# Patient Record
Sex: FEMALE | Race: BLACK OR AFRICAN AMERICAN | NOT HISPANIC OR LATINO | URBAN - METROPOLITAN AREA
[De-identification: names, ages, dates, MRNs, and addresses within clinical notes are randomized per-mention and may not be internally consistent; named-entity substitution may affect disease eponyms.]

---

## 2017-02-01 ENCOUNTER — INPATIENT (INPATIENT)
Facility: HOSPITAL | Age: 77
LOS: 1 days | Discharge: HOME | End: 2017-02-03
Attending: INTERNAL MEDICINE | Admitting: INTERNAL MEDICINE
Payer: MEDICARE

## 2017-02-01 VITALS
DIASTOLIC BLOOD PRESSURE: 65 MMHG | OXYGEN SATURATION: 100 % | SYSTOLIC BLOOD PRESSURE: 134 MMHG | HEART RATE: 85 BPM | HEIGHT: 60 IN | RESPIRATION RATE: 16 BRPM | TEMPERATURE: 98 F | WEIGHT: 164.91 LBS

## 2017-02-01 DIAGNOSIS — E11.9 TYPE 2 DIABETES MELLITUS WITHOUT COMPLICATIONS: ICD-10-CM

## 2017-02-01 LAB
ALBUMIN SERPL ELPH-MCNC: 3.5 G/DL — SIGNIFICANT CHANGE UP (ref 3.3–5)
ALP SERPL-CCNC: 80 U/L — SIGNIFICANT CHANGE UP (ref 40–120)
ALT FLD-CCNC: 19 U/L — SIGNIFICANT CHANGE UP (ref 12–78)
ANION GAP SERPL CALC-SCNC: 12 MMOL/L — SIGNIFICANT CHANGE UP (ref 5–17)
APTT BLD: 31.3 SEC — SIGNIFICANT CHANGE UP (ref 27.5–37.4)
AST SERPL-CCNC: 21 U/L — SIGNIFICANT CHANGE UP (ref 15–37)
BASOPHILS # BLD AUTO: 0.1 K/UL — SIGNIFICANT CHANGE UP (ref 0–0.2)
BASOPHILS NFR BLD AUTO: 0.8 % — SIGNIFICANT CHANGE UP (ref 0–2)
BILIRUB SERPL-MCNC: 0.4 MG/DL — SIGNIFICANT CHANGE UP (ref 0.2–1.2)
BLD GP AB SCN SERPL QL: SIGNIFICANT CHANGE UP
BUN SERPL-MCNC: 41 MG/DL — HIGH (ref 7–23)
CALCIUM SERPL-MCNC: 8.1 MG/DL — LOW (ref 8.5–10.1)
CHLORIDE SERPL-SCNC: 103 MMOL/L — SIGNIFICANT CHANGE UP (ref 96–108)
CK MB BLD-MCNC: <0.8 % — SIGNIFICANT CHANGE UP (ref 0–3.5)
CK MB CFR SERPL CALC: <0.5 NG/ML — SIGNIFICANT CHANGE UP (ref 0.5–3.6)
CK SERPL-CCNC: 66 U/L — SIGNIFICANT CHANGE UP (ref 26–192)
CO2 SERPL-SCNC: 28 MMOL/L — SIGNIFICANT CHANGE UP (ref 22–31)
CREAT SERPL-MCNC: 8.68 MG/DL — HIGH (ref 0.5–1.3)
EOSINOPHIL # BLD AUTO: 0.1 K/UL — SIGNIFICANT CHANGE UP (ref 0–0.5)
EOSINOPHIL NFR BLD AUTO: 1.3 % — SIGNIFICANT CHANGE UP (ref 0–6)
GLUCOSE SERPL-MCNC: 149 MG/DL — HIGH (ref 70–99)
HCT VFR BLD CALC: 35 % — SIGNIFICANT CHANGE UP (ref 34.5–45)
HGB BLD-MCNC: 11.2 G/DL — LOW (ref 11.5–15.5)
INR BLD: 1.09 RATIO — SIGNIFICANT CHANGE UP (ref 0.88–1.16)
LACTATE SERPL-SCNC: 1.3 MMOL/L — SIGNIFICANT CHANGE UP (ref 0.7–2)
LYMPHOCYTES # BLD AUTO: 0.9 K/UL — LOW (ref 1–3.3)
LYMPHOCYTES # BLD AUTO: 14.6 % — SIGNIFICANT CHANGE UP (ref 13–44)
MCHC RBC-ENTMCNC: 32 GM/DL — SIGNIFICANT CHANGE UP (ref 32–36)
MCHC RBC-ENTMCNC: 32 PG — SIGNIFICANT CHANGE UP (ref 27–34)
MCV RBC AUTO: 100.1 FL — HIGH (ref 80–100)
MONOCYTES # BLD AUTO: 0.4 K/UL — SIGNIFICANT CHANGE UP (ref 0–0.9)
MONOCYTES NFR BLD AUTO: 6.9 % — SIGNIFICANT CHANGE UP (ref 2–14)
NEUTROPHILS # BLD AUTO: 4.8 K/UL — SIGNIFICANT CHANGE UP (ref 1.8–7.4)
NEUTROPHILS NFR BLD AUTO: 76.4 % — SIGNIFICANT CHANGE UP (ref 43–77)
PLATELET # BLD AUTO: 156 K/UL — SIGNIFICANT CHANGE UP (ref 150–400)
POTASSIUM SERPL-MCNC: 3.9 MMOL/L — SIGNIFICANT CHANGE UP (ref 3.5–5.3)
POTASSIUM SERPL-SCNC: 3.9 MMOL/L — SIGNIFICANT CHANGE UP (ref 3.5–5.3)
PROT SERPL-MCNC: 7.6 GM/DL — SIGNIFICANT CHANGE UP (ref 6–8.3)
PROTHROM AB SERPL-ACNC: 12.2 SEC — SIGNIFICANT CHANGE UP (ref 10–13.1)
RBC # BLD: 3.49 M/UL — LOW (ref 3.8–5.2)
RBC # FLD: 17.9 % — HIGH (ref 11–15)
SODIUM SERPL-SCNC: 143 MMOL/L — SIGNIFICANT CHANGE UP (ref 135–145)
TROPONIN I SERPL-MCNC: <.015 NG/ML — SIGNIFICANT CHANGE UP (ref 0.01–0.04)
WBC # BLD: 6.3 K/UL — SIGNIFICANT CHANGE UP (ref 3.8–10.5)
WBC # FLD AUTO: 6.3 K/UL — SIGNIFICANT CHANGE UP (ref 3.8–10.5)

## 2017-02-01 PROCEDURE — 70551 MRI BRAIN STEM W/O DYE: CPT | Mod: 26

## 2017-02-01 PROCEDURE — 70450 CT HEAD/BRAIN W/O DYE: CPT | Mod: 26

## 2017-02-01 PROCEDURE — 99285 EMERGENCY DEPT VISIT HI MDM: CPT

## 2017-02-01 PROCEDURE — 71010: CPT | Mod: 26

## 2017-02-01 RX ORDER — LOSARTAN POTASSIUM 100 MG/1
100 TABLET, FILM COATED ORAL DAILY
Qty: 0 | Refills: 0 | Status: DISCONTINUED | OUTPATIENT
Start: 2017-02-01 | End: 2017-02-03

## 2017-02-01 RX ORDER — BRIMONIDINE TARTRATE 2 MG/MG
1 SOLUTION/ DROPS OPHTHALMIC
Qty: 0 | Refills: 0 | Status: DISCONTINUED | OUTPATIENT
Start: 2017-02-01 | End: 2017-02-03

## 2017-02-01 RX ORDER — CALCIUM ACETATE 667 MG
1334 TABLET ORAL
Qty: 0 | Refills: 0 | Status: DISCONTINUED | OUTPATIENT
Start: 2017-02-01 | End: 2017-02-03

## 2017-02-01 RX ORDER — INSULIN LISPRO 100/ML
VIAL (ML) SUBCUTANEOUS
Qty: 0 | Refills: 0 | Status: DISCONTINUED | OUTPATIENT
Start: 2017-02-01 | End: 2017-02-02

## 2017-02-01 RX ORDER — DEXTROSE 50 % IN WATER 50 %
12.5 SYRINGE (ML) INTRAVENOUS ONCE
Qty: 0 | Refills: 0 | Status: DISCONTINUED | OUTPATIENT
Start: 2017-02-01 | End: 2017-02-02

## 2017-02-01 RX ORDER — GLUCAGON INJECTION, SOLUTION 0.5 MG/.1ML
1 INJECTION, SOLUTION SUBCUTANEOUS ONCE
Qty: 0 | Refills: 0 | Status: DISCONTINUED | OUTPATIENT
Start: 2017-02-01 | End: 2017-02-02

## 2017-02-01 RX ORDER — AMLODIPINE BESYLATE 2.5 MG/1
10 TABLET ORAL DAILY
Qty: 0 | Refills: 0 | Status: DISCONTINUED | OUTPATIENT
Start: 2017-02-01 | End: 2017-02-03

## 2017-02-01 RX ORDER — SODIUM CHLORIDE 9 MG/ML
1000 INJECTION, SOLUTION INTRAVENOUS
Qty: 0 | Refills: 0 | Status: DISCONTINUED | OUTPATIENT
Start: 2017-02-01 | End: 2017-02-02

## 2017-02-01 RX ORDER — CALCIUM CARBONATE 500(1250)
4 TABLET ORAL DAILY
Qty: 0 | Refills: 0 | Status: DISCONTINUED | OUTPATIENT
Start: 2017-02-01 | End: 2017-02-03

## 2017-02-01 RX ORDER — DEXTROSE 50 % IN WATER 50 %
25 SYRINGE (ML) INTRAVENOUS ONCE
Qty: 0 | Refills: 0 | Status: DISCONTINUED | OUTPATIENT
Start: 2017-02-01 | End: 2017-02-02

## 2017-02-01 RX ORDER — DEXTROSE 50 % IN WATER 50 %
1 SYRINGE (ML) INTRAVENOUS ONCE
Qty: 0 | Refills: 0 | Status: DISCONTINUED | OUTPATIENT
Start: 2017-02-01 | End: 2017-02-02

## 2017-02-01 RX ORDER — FERROUS SULFATE 325(65) MG
325 TABLET ORAL DAILY
Qty: 0 | Refills: 0 | Status: DISCONTINUED | OUTPATIENT
Start: 2017-02-01 | End: 2017-02-03

## 2017-02-01 RX ORDER — ASPIRIN/CALCIUM CARB/MAGNESIUM 324 MG
81 TABLET ORAL DAILY
Qty: 0 | Refills: 0 | Status: DISCONTINUED | OUTPATIENT
Start: 2017-02-01 | End: 2017-02-03

## 2017-02-01 RX ORDER — CARVEDILOL PHOSPHATE 80 MG/1
12.5 CAPSULE, EXTENDED RELEASE ORAL EVERY 12 HOURS
Qty: 0 | Refills: 0 | Status: DISCONTINUED | OUTPATIENT
Start: 2017-02-01 | End: 2017-02-03

## 2017-02-01 RX ORDER — DORZOLAMIDE HYDROCHLORIDE 20 MG/ML
1 SOLUTION/ DROPS OPHTHALMIC AT BEDTIME
Qty: 0 | Refills: 0 | Status: DISCONTINUED | OUTPATIENT
Start: 2017-02-01 | End: 2017-02-03

## 2017-02-01 RX ADMIN — Medication 81 MILLIGRAM(S): at 21:24

## 2017-02-01 RX ADMIN — BRIMONIDINE TARTRATE 1 DROP(S): 2 SOLUTION/ DROPS OPHTHALMIC at 21:38

## 2017-02-01 RX ADMIN — DORZOLAMIDE HYDROCHLORIDE 1 DROP(S): 20 SOLUTION/ DROPS OPHTHALMIC at 21:37

## 2017-02-01 RX ADMIN — Medication 4 TABLET(S): at 21:42

## 2017-02-01 NOTE — CONSULT NOTE ADULT - ASSESSMENT
consult dict awake alert speehc fluent weakness dizziness  lighthaedadedness hx of htn diabetes  ct head and mri brain no acute path  discuss with family   non focla exam
diabetes   CVA

## 2017-02-01 NOTE — CONSULT NOTE ADULT - SUBJECTIVE AND OBJECTIVE BOX
Patient is a 76y old  Female who presents with a chief complaint of near syncope at onset of HD  HPI: She was sent in by me from HD after hypotensive episode in first 25- 30 min of HD. UF volume minimal at 300 ml, no enough to explain hypotension. BP javad  transiently 60/30-80/40's improved with 1 liter bolus plus rinse back. Noted mild left facial droop, which improved over 10-15 minutes. No chest pain or SOB. BS was 170's during episode.    Patient is s/p cardiac stress test last Saturday, await results.         PAST MEDICAL & SURGICAL HISTORY:  ESRD on hemodialysis: Mon, Wednes, Fri  Anemia  Obesity (BMI 30-39.9)  Glaucoma  DM (diabetes mellitus)  CKD (chronic kidney disease)  HTN (hypertension)  History of cholecystectomy  S/P colon resection    FAMILY HISTORY:  No pertinent family history in first degree relatives    No Known Allergies    MEDICATIONS  (STANDING):    MEDICATIONS  (PRN):    Vital Signs Last 24 Hrs  T(C): 36.7, Max: 36.7 (02-01 @ 16:30)  T(F): 98.1, Max: 98.1 (02-01 @ 16:30)  HR: 85 (85 - 85)  BP: 134/65 (134/65 - 134/65)  BP(mean): --  RR: 16 (16 - 16)  SpO2: 100% (100% - 100%)    CAPILLARY BLOOD GLUCOSE    PHYSICAL EXAM: alert and awake, x3;       T(C): 36.7, Max: 36.7 (02-01 @ 16:30)  HR: 85 (85 - 85)  BP: 134/65 (134/65 - 134/65)  RR: 16 (16 - 16)  SpO2: 100% (100% - 100%)  Wt(kg): --  Respiratory: clear anteriorly, decreased BS at bases  Cardiovascular: S1 S2  Gastrointestinal: soft NT ND +BS  Extremities:   edema  Neuro no facial droop   4+/5 all extremities          PENDING          Assessment and Plan    ESRD with acute onset hypotension at onset of HD; r/o cardiac; ACS; with TIA.  CT head; 2D echo  Hold HD today, will arrange for tomorrow.  Cardio evaluation.  Discussed with daughters at bedside. Patient is a 76y old  Female who presents with a chief complaint of near syncope at onset of HD  HPI: She was sent in by me from HD after hypotensive episode in first 25- 30 min of HD. UF volume minimal at 300 ml, no enough to explain hypotension. BP javad  transiently 60/30-80/40's improved with 1 liter bolus plus rinse back. Noted mild left facial droop, which improved over 10-15 minutes. No chest pain or SOB. BS was 170's during episode.    Patient is s/p cardiac stress test last Saturday, await results.         PAST MEDICAL & SURGICAL HISTORY:  ESRD on hemodialysis: Mon, Wednes, Fri  Anemia  Obesity (BMI 30-39.9)  Glaucoma  DM (diabetes mellitus)  CKD (chronic kidney disease)  HTN (hypertension)  History of cholecystectomy  S/P colon resection    FAMILY HISTORY:  No pertinent family history in first degree relatives    No Known Allergies    MEDICATIONS  (STANDING):    MEDICATIONS  (PRN):    Vital Signs Last 24 Hrs  T(C): 36.7, Max: 36.7 (02-01 @ 16:30)  T(F): 98.1, Max: 98.1 (02-01 @ 16:30)  HR: 85 (85 - 85)  BP: 134/65 (134/65 - 134/65)  BP(mean): --  RR: 16 (16 - 16)  SpO2: 100% (100% - 100%)    CAPILLARY BLOOD GLUCOSE    PHYSICAL EXAM: alert and awake, x3;       T(C): 36.7, Max: 36.7 (02-01 @ 16:30)  HR: 85 (85 - 85)  BP: 134/65 (134/65 - 134/65)  RR: 16 (16 - 16)  SpO2: 100% (100% - 100%)  Wt(kg): --  Respiratory: clear anteriorly, decreased BS at bases  Cardiovascular: S1 S2  Gastrointestinal: soft NT ND +BS  Extremities:   edema  Neuro no facial droop   4+/5 all extremities          PENDING          Assessment and Plan    ESRD with acute onset hypotension at onset of HD; r/o cardiac; ACS; with TIA.  CT head noted; 2D echo ordered  Hold HD today, will arrange for tomorrow.  Cardio evaluation.  Discussed with daughters at bedside.

## 2017-02-01 NOTE — ED ADULT TRIAGE NOTE - CHIEF COMPLAINT QUOTE
while receiving dialysis treatment pt had an episode of right sided weakness, slurred speech and hypotension. Per EMS symptoms lasted 10mins, resolved upon arrival to ED.

## 2017-02-01 NOTE — CONSULT NOTE ADULT - PROBLEM SELECTOR RECOMMENDATION 9
Continue with the same regimen while inpatient - Lispro sliding scale coverage with meals   Patient should have strict diet control and do exercise as tolerated upon discharge   Patient can resume  home regimen upon discharge   while inpatient better to have FS< 150 and preferably in 120-140 range   Thank You for the courtesy of this consultation !!!

## 2017-02-01 NOTE — ED ADULT NURSE NOTE - OBJECTIVE STATEMENT
Pt presented for stroke symptoms prior to my shift. Pt wasn't seen at time of triage by me. As per pt, "I was in dialysis and felt dizzy, nad was made known that her bp dropped".

## 2017-02-01 NOTE — H&P ADULT. - HISTORY OF PRESENT ILLNESS
76 years old female by ems c/o left side weakness during hemodialysis at 15:48 pm. Ems sts the symptoms lasted about 15 minutes. Dr. Vidal the nephrologist called here sts he was there and found pt blood pressure systolic was  between 70 to 80, with left side facail drooping and left side weakness pt was only at 2 hours dialysis. He sts pt got better after he gave pt about 2 liters of fluid. Here pt is alert and oriented x 3 smiling denies headache, dizziness, sob, chest pain, blurred visions, light sensitivities, focal/distal weakness

## 2017-02-01 NOTE — ED PROVIDER NOTE - PROGRESS NOTE DETAILS
Pt is not a candidate for tPA because pt's NIH score now is zero. pt is still smiling alert and oriented x 3 and NIH score is zero again Dr. Vidal nephrologist is here eval pt Pt's NIH score is still zero Dr. Rush neurologist is notified Pt is going to mri of brain Pt is still alert and oriented x 3 smiling NIH score is still zero. Dr. Vidal sts call Dr. Paz who is coving Dr. Chino to admit pt Dr. Paz is notified and admit pt

## 2017-02-01 NOTE — CONSULT NOTE ADULT - SUBJECTIVE AND OBJECTIVE BOX
Patient is a 76y old  Female who presents with a chief complaint of     Reason For Consult: diabetes     HPI: patient is known to me from my office. HbA1C has been < 6.0 secondary to Chronic Renal Failure and decreased Renal Gluconeogenesis   patient has features of Metabolic syndrome but otherwise stable   Had a hypotensive episode during hemodialysis with acute CVA features and symptoms   being admitted for the work up of the same           ,      PAST MEDICAL & SURGICAL HISTORY:  ESRD on hemodialysis: Mon, Wens, Fri  Anemia  Obesity (BMI 30-39.9)  Glaucoma  DM (diabetes mellitus)  CKD (chronic kidney disease)  HTN (hypertension)  History of cholecystectomy  S/P colon resection      FAMILY HISTORY:  No pertinent family history in first degree relatives        Social History:    MEDICATIONS  (STANDING):  insulin lispro (HumaLOG) corrective regimen sliding scale  SubCutaneous three times a day before meals  dextrose 5%. 1000milliLiter(s) IV Continuous <Continuous>  dextrose 50% Injectable 12.5Gram(s) IV Push once  dextrose 50% Injectable 25Gram(s) IV Push once  dextrose 50% Injectable 25Gram(s) IV Push once  losartan 100milliGRAM(s) Oral daily  calcium carbonate 1250 mG (OsCal) 4Tablet(s) Oral daily  cloNIDine 0.1milliGRAM(s) Oral every 12 hours  carvedilol 12.5milliGRAM(s) Oral every 12 hours  amLODIPine   Tablet 10milliGRAM(s) Oral daily  ferrous    sulfate 325milliGRAM(s) Oral daily  brimonidine 0.2% Ophthalmic Solution 1Drop(s) Both EYES two times a day  dorzolamide 2% Ophthalmic Solution 1Drop(s) Both EYES at bedtime    MEDICATIONS  (PRN):  dextrose Gel 1Dose(s) Oral once PRN Blood Glucose LESS THAN 70 milliGRAM(s)/deciLiter  glucagon  Injectable 1milliGRAM(s) IntraMuscular once PRN Glucose <70 milliGRAM(s)/deciLiter      REVIEW OF SYSTEMS:  CONSTITUTIONAL:  as per HPI  HEENT:  Eyes:  No diplopia or blurred vision. ENT:  No earache, sore throat or runny nose.  CARDIOVASCULAR:  No pressure, squeezing, strangling, tightness, heaviness or aching about the chest, neck, axilla or epigastrium.  RESPIRATORY:  No cough, shortness of breath, PND or orthopnea.  GASTROINTESTINAL:  No nausea, vomiting or diarrhea.  GENITOURINARY:  No dysuria, frequency or urgency. No Blood in urine  MUSCULOSKELETAL:  no joint aches, no muscle pain, myalgia  SKIN:  No change in skin, hair or nails.  NEUROLOGIC:  No paresthesias, fasciculations, seizures but weakness present   PSYCHIATRIC:  No disorder of thought or mood.  ENDOCRINE:  No heat or cold intolerance, polyuria or polydipsia. abnormal weight gain or loss, oral thrush  HEMATOLOGICAL:  No easy bruising or bleeding.     T(C): 36.7, Max: 36.7 (02-01 @ 16:30)  HR: 85 (85 - 85)  BP: 134/65 (134/65 - 134/65)  RR: 16 (16 - 16)  SpO2: 100% (100% - 100%)  Wt(kg): --    PHYSICAL EXAM:  GENERAL: NAD, well-groomed, well-developed  HEAD:  Atraumatic, Normocephalic  EYES: EOMI, PERRLA, conjunctiva and sclera clear  ENMT: No tonsillar erythema, exudates, or enlargement; Moist mucous membranes, Good dentition, No lesions  NECK: Supple, No JVD, Normal thyroid  NERVOUS SYSTEM:  Alert & Oriented X3, Good concentration; no obvious Neuro deficit seen   CHEST/LUNG: Clear to percussion bilaterally; No rales, rhonchi, wheezing, or rubs  HEART: Regular rate and rhythm; No murmurs, rubs, or gallops  ABDOMEN: Soft, Nontender, Nondistended; Bowel sounds present  EXTREMITIES:  2+ Peripheral Pulses, No clubbing, cyanosis, or edema  LYMPH: No lymphadenopathy noted  SKIN: No rashes or lesions    CAPILLARY BLOOD GLUCOSE                            11.2   6.3   )-----------( 156      ( 01 Feb 2017 17:37 )             35.0       CMP:  02-01 @ 17:37  SGPT 19  Albumin 3.5   Alk Phos 80   Anion Gap 12   SGOT 21   Total Bili 0.4   BUN 41   Calcium Total 8.1   CO2 28   Chloride 103   Creatinine 8.68   eGFR if AA 5   eGFR if non AA 4   Glucose 149   Potassium 3.9   Protein 7.6   Sodium 143      Thyroid Function Tests:      Diabetes Tests:     Parathyroids:     Adrenals:       Radiology:

## 2017-02-01 NOTE — ED PROVIDER NOTE - OBJECTIVE STATEMENT
76 years old female by ems c/o left side weakness during hemodialysis at 15:48 pm. Ems sts the symptoms lasted about 15 minutes. Dr. Vidal the nephrologist called here sts he was there and found pt blood pressure systolic was  betwwen 70 to 80, with left side facail drooping and left side weakness pt was only at 2 hours dialysis. He sts pt got better after he gave pt about 2 liters of fluid. Here pt is alert and oriented x 3 smiling denies headache, dizziness, sob, chest pain, blurred visions, light sensitivities, focal/distal weakness or numbness, cough, sob, chest pain, nausea, vomiting, fever, chills, abd pain.

## 2017-02-01 NOTE — H&P ADULT. - ASSESSMENT
76 years old female is admitted for TIA, hypotension, CKD on HD  Plan: Monitor BP. For MRI of brain.

## 2017-02-01 NOTE — ED PROVIDER NOTE - PMH
Anemia    CKD (chronic kidney disease)    DM (diabetes mellitus)    ESRD on hemodialysis  Mon, Wens, Fri  Glaucoma    HTN (hypertension)    Obesity (BMI 30-39.9)

## 2017-02-02 LAB
ANION GAP SERPL CALC-SCNC: 14 MMOL/L — SIGNIFICANT CHANGE UP (ref 5–17)
BASOPHILS # BLD AUTO: 0 K/UL — SIGNIFICANT CHANGE UP (ref 0–0.2)
BASOPHILS NFR BLD AUTO: 1.2 % — SIGNIFICANT CHANGE UP (ref 0–2)
BUN SERPL-MCNC: 50 MG/DL — HIGH (ref 7–23)
CALCIUM SERPL-MCNC: 8.7 MG/DL — SIGNIFICANT CHANGE UP (ref 8.5–10.1)
CHLORIDE SERPL-SCNC: 103 MMOL/L — SIGNIFICANT CHANGE UP (ref 96–108)
CO2 SERPL-SCNC: 26 MMOL/L — SIGNIFICANT CHANGE UP (ref 22–31)
CREAT SERPL-MCNC: 10.2 MG/DL — HIGH (ref 0.5–1.3)
EOSINOPHIL # BLD AUTO: 0.1 K/UL — SIGNIFICANT CHANGE UP (ref 0–0.5)
EOSINOPHIL NFR BLD AUTO: 2.3 % — SIGNIFICANT CHANGE UP (ref 0–6)
GLUCOSE SERPL-MCNC: 144 MG/DL — HIGH (ref 70–99)
HBA1C BLD-MCNC: 6.2 % — HIGH (ref 4–5.6)
HCT VFR BLD CALC: 28.6 % — LOW (ref 34.5–45)
HGB BLD-MCNC: 9.3 G/DL — LOW (ref 11.5–15.5)
LYMPHOCYTES # BLD AUTO: 1.1 K/UL — SIGNIFICANT CHANGE UP (ref 1–3.3)
LYMPHOCYTES # BLD AUTO: 28.2 % — SIGNIFICANT CHANGE UP (ref 13–44)
MCHC RBC-ENTMCNC: 32.5 GM/DL — SIGNIFICANT CHANGE UP (ref 32–36)
MCHC RBC-ENTMCNC: 32.6 PG — SIGNIFICANT CHANGE UP (ref 27–34)
MCV RBC AUTO: 100.4 FL — HIGH (ref 80–100)
MONOCYTES # BLD AUTO: 0.4 K/UL — SIGNIFICANT CHANGE UP (ref 0–0.9)
MONOCYTES NFR BLD AUTO: 9.1 % — SIGNIFICANT CHANGE UP (ref 2–14)
NEUTROPHILS # BLD AUTO: 2.3 K/UL — SIGNIFICANT CHANGE UP (ref 1.8–7.4)
NEUTROPHILS NFR BLD AUTO: 59.2 % — SIGNIFICANT CHANGE UP (ref 43–77)
PLATELET # BLD AUTO: 141 K/UL — LOW (ref 150–400)
POTASSIUM SERPL-MCNC: 3.8 MMOL/L — SIGNIFICANT CHANGE UP (ref 3.5–5.3)
POTASSIUM SERPL-SCNC: 3.8 MMOL/L — SIGNIFICANT CHANGE UP (ref 3.5–5.3)
RBC # BLD: 2.85 M/UL — LOW (ref 3.8–5.2)
RBC # FLD: 17.7 % — HIGH (ref 11–15)
SODIUM SERPL-SCNC: 143 MMOL/L — SIGNIFICANT CHANGE UP (ref 135–145)
WBC # BLD: 4 K/UL — SIGNIFICANT CHANGE UP (ref 3.8–10.5)
WBC # FLD AUTO: 4 K/UL — SIGNIFICANT CHANGE UP (ref 3.8–10.5)

## 2017-02-02 PROCEDURE — 93306 TTE W/DOPPLER COMPLETE: CPT | Mod: 26

## 2017-02-02 RX ORDER — DEXTROSE 50 % IN WATER 50 %
1 SYRINGE (ML) INTRAVENOUS ONCE
Qty: 0 | Refills: 0 | Status: DISCONTINUED | OUTPATIENT
Start: 2017-02-02 | End: 2017-02-03

## 2017-02-02 RX ORDER — SODIUM CHLORIDE 9 MG/ML
1000 INJECTION, SOLUTION INTRAVENOUS
Qty: 0 | Refills: 0 | Status: DISCONTINUED | OUTPATIENT
Start: 2017-02-02 | End: 2017-02-03

## 2017-02-02 RX ORDER — GLUCAGON INJECTION, SOLUTION 0.5 MG/.1ML
1 INJECTION, SOLUTION SUBCUTANEOUS ONCE
Qty: 0 | Refills: 0 | Status: DISCONTINUED | OUTPATIENT
Start: 2017-02-02 | End: 2017-02-03

## 2017-02-02 RX ORDER — DEXTROSE 50 % IN WATER 50 %
12.5 SYRINGE (ML) INTRAVENOUS ONCE
Qty: 0 | Refills: 0 | Status: DISCONTINUED | OUTPATIENT
Start: 2017-02-02 | End: 2017-02-03

## 2017-02-02 RX ORDER — DEXTROSE 50 % IN WATER 50 %
25 SYRINGE (ML) INTRAVENOUS ONCE
Qty: 0 | Refills: 0 | Status: DISCONTINUED | OUTPATIENT
Start: 2017-02-02 | End: 2017-02-03

## 2017-02-02 RX ORDER — INSULIN LISPRO 100/ML
VIAL (ML) SUBCUTANEOUS
Qty: 0 | Refills: 0 | Status: DISCONTINUED | OUTPATIENT
Start: 2017-02-02 | End: 2017-02-03

## 2017-02-02 RX ORDER — INSULIN LISPRO 100/ML
VIAL (ML) SUBCUTANEOUS AT BEDTIME
Qty: 0 | Refills: 0 | Status: DISCONTINUED | OUTPATIENT
Start: 2017-02-02 | End: 2017-02-03

## 2017-02-02 RX ADMIN — Medication 1334 MILLIGRAM(S): at 07:59

## 2017-02-02 RX ADMIN — Medication 4: at 11:06

## 2017-02-02 RX ADMIN — Medication 81 MILLIGRAM(S): at 11:07

## 2017-02-02 RX ADMIN — LOSARTAN POTASSIUM 100 MILLIGRAM(S): 100 TABLET, FILM COATED ORAL at 11:08

## 2017-02-02 RX ADMIN — BRIMONIDINE TARTRATE 1 DROP(S): 2 SOLUTION/ DROPS OPHTHALMIC at 05:51

## 2017-02-02 RX ADMIN — Medication 1334 MILLIGRAM(S): at 11:07

## 2017-02-02 RX ADMIN — BRIMONIDINE TARTRATE 1 DROP(S): 2 SOLUTION/ DROPS OPHTHALMIC at 21:12

## 2017-02-02 RX ADMIN — Medication 2: at 08:00

## 2017-02-02 RX ADMIN — AMLODIPINE BESYLATE 10 MILLIGRAM(S): 2.5 TABLET ORAL at 05:51

## 2017-02-02 RX ADMIN — CARVEDILOL PHOSPHATE 12.5 MILLIGRAM(S): 80 CAPSULE, EXTENDED RELEASE ORAL at 05:51

## 2017-02-02 RX ADMIN — Medication 325 MILLIGRAM(S): at 11:08

## 2017-02-02 RX ADMIN — DORZOLAMIDE HYDROCHLORIDE 1 DROP(S): 20 SOLUTION/ DROPS OPHTHALMIC at 21:12

## 2017-02-02 RX ADMIN — Medication 0.1 MILLIGRAM(S): at 05:51

## 2017-02-02 NOTE — DISCHARGE NOTE ADULT - SECONDARY DIAGNOSIS.
Hypotension (arterial) CKD (chronic kidney disease) ESRD on hemodialysis Type 2 diabetes mellitus without complication, unspecified long term insulin use status Glaucoma

## 2017-02-02 NOTE — DISCHARGE NOTE ADULT - HOSPITAL COURSE
84 years old female was admitted for TIA, which related to hypotension during the dialysis for CKD V. BP meds are all discontinued. MRI of brain revealed no acute lesion. BP is improved. 84 years old female was admitted for TIA, which related to hypotension during the dialysis for CKD V. BP meds are all discontinued. MRI of brain revealed no acute lesion. BP is improved. Life style change for elevated lipid.

## 2017-02-02 NOTE — DISCHARGE NOTE ADULT - NS AS DC STROKE ED MATERIALS
Prescribed Medications/Risk Factors for Stroke/Need for Followup After Discharge/Stroke Warning Signs and Symptoms/Call 911 for Stroke/Stroke Education Booklet Risk Factors for Stroke/Prescribed Medications/Stroke Education Booklet/Call 911 for Stroke/Need for Followup After Discharge/Stroke Warning Signs and Symptoms

## 2017-02-02 NOTE — DISCHARGE NOTE ADULT - CARE PROVIDER_API CALL
Ken Paz), Internal Medicine  [address] [place] [address]  Phone: [phone]  Fax: [phone]

## 2017-02-02 NOTE — DISCHARGE NOTE ADULT - MEDICATION SUMMARY - MEDICATIONS TO TAKE
I will START or STAY ON the medications listed below when I get home from the hospital:    aspirin 81 mg oral delayed release tablet  -- 1 tab(s) by mouth once a day  -- Indication: For TIA (transient ischemic attack)    ferrous sulfate 325 mg (65 mg elemental iron) oral tablet  -- 1 tab(s) by mouth every 8 hours  -- Indication: For Anemia    Alphagan  -- 1 drop(s) to each affected eye 2 times a day - 0.1%  -- Indication: For Glaucoma    dorzolamide 2% ophthalmic solution  -- 1 drop(s) to each affected eye once a day (at bedtime)  -- Indication: For Glaucoma    calcium acetate 667 mg oral tablet  -- 2 tab(s) by mouth 3 times a day  -- Indication: For CKD

## 2017-02-02 NOTE — DISCHARGE NOTE ADULT - CARE PLAN
Principal Discharge DX:	TIA (transient ischemic attack)  Goal:	Resolved  Instructions for follow-up, activity and diet:	Take aspirin  Secondary Diagnosis:	Hypotension (arterial)  Goal:	Improved, off BP meds  Secondary Diagnosis:	CKD (chronic kidney disease)  Goal:	Dialysis  Secondary Diagnosis:	ESRD on hemodialysis  Goal:	Dialysis  Secondary Diagnosis:	Type 2 diabetes mellitus without complication, unspecified long term insulin use status  Secondary Diagnosis:	Glaucoma

## 2017-02-02 NOTE — DISCHARGE NOTE ADULT - MEDICATION SUMMARY - MEDICATIONS TO STOP TAKING
I will STOP taking the medications listed below when I get home from the hospital:    amLODIPine 10 mg oral tablet  -- 1 tab(s) by mouth once a day    calcium carbonate 1250 mg (500 mg elemental calcium) oral tablet  -- 4 tab(s) by mouth once a day    losartan 100 mg oral tablet  -- 1 tab(s) by mouth once a day    cloNIDine 0.1 mg oral tablet  -- 1 tab(s) by mouth every 12 hours    carvedilol 12.5 mg oral tablet  -- 1 tab(s) by mouth every 12 hours

## 2017-02-02 NOTE — DISCHARGE NOTE ADULT - PATIENT PORTAL LINK FT
“You can access the [employer] Patient Portal, offered by [facility], by registering with the following website: [email or web address]”

## 2017-02-03 VITALS
DIASTOLIC BLOOD PRESSURE: 58 MMHG | TEMPERATURE: 98 F | RESPIRATION RATE: 16 BRPM | HEART RATE: 70 BPM | SYSTOLIC BLOOD PRESSURE: 134 MMHG | OXYGEN SATURATION: 98 %

## 2017-02-03 LAB
ANION GAP SERPL CALC-SCNC: 12 MMOL/L — SIGNIFICANT CHANGE UP (ref 5–17)
BUN SERPL-MCNC: 30 MG/DL — HIGH (ref 7–23)
CALCIUM SERPL-MCNC: 8.5 MG/DL — SIGNIFICANT CHANGE UP (ref 8.5–10.1)
CHLORIDE SERPL-SCNC: 102 MMOL/L — SIGNIFICANT CHANGE UP (ref 96–108)
CHOLEST SERPL-MCNC: 193 MG/DL — SIGNIFICANT CHANGE UP (ref 10–199)
CO2 SERPL-SCNC: 29 MMOL/L — SIGNIFICANT CHANGE UP (ref 22–31)
CREAT SERPL-MCNC: 6.52 MG/DL — HIGH (ref 0.5–1.3)
GLUCOSE SERPL-MCNC: 171 MG/DL — HIGH (ref 70–99)
HCT VFR BLD CALC: 28.8 % — LOW (ref 34.5–45)
HDLC SERPL-MCNC: 52 MG/DL — SIGNIFICANT CHANGE UP (ref 40–125)
HGB BLD-MCNC: 9.6 G/DL — LOW (ref 11.5–15.5)
LIPID PNL WITH DIRECT LDL SERPL: 115 MG/DL — SIGNIFICANT CHANGE UP
MCHC RBC-ENTMCNC: 32.4 PG — SIGNIFICANT CHANGE UP (ref 27–34)
MCHC RBC-ENTMCNC: 33.5 GM/DL — SIGNIFICANT CHANGE UP (ref 32–36)
MCV RBC AUTO: 96.8 FL — SIGNIFICANT CHANGE UP (ref 80–100)
PLATELET # BLD AUTO: 182 K/UL — SIGNIFICANT CHANGE UP (ref 150–400)
POTASSIUM SERPL-MCNC: 3.8 MMOL/L — SIGNIFICANT CHANGE UP (ref 3.5–5.3)
POTASSIUM SERPL-SCNC: 3.8 MMOL/L — SIGNIFICANT CHANGE UP (ref 3.5–5.3)
RBC # BLD: 2.97 M/UL — LOW (ref 3.8–5.2)
RBC # FLD: 16.7 % — HIGH (ref 11–15)
SODIUM SERPL-SCNC: 143 MMOL/L — SIGNIFICANT CHANGE UP (ref 135–145)
TOTAL CHOLESTEROL/HDL RATIO MEASUREMENT: 3.7 RATIO — SIGNIFICANT CHANGE UP (ref 3.3–7.1)
TRIGL SERPL-MCNC: 129 MG/DL — SIGNIFICANT CHANGE UP (ref 10–149)
WBC # BLD: 3.8 K/UL — SIGNIFICANT CHANGE UP (ref 3.8–10.5)
WBC # FLD AUTO: 3.8 K/UL — SIGNIFICANT CHANGE UP (ref 3.8–10.5)

## 2017-02-03 RX ORDER — ERYTHROPOIETIN 10000 [IU]/ML
10000 INJECTION, SOLUTION INTRAVENOUS; SUBCUTANEOUS ONCE
Qty: 0 | Refills: 0 | Status: COMPLETED | OUTPATIENT
Start: 2017-02-03 | End: 2017-02-03

## 2017-02-03 RX ORDER — DORZOLAMIDE HYDROCHLORIDE 20 MG/ML
1 SOLUTION/ DROPS OPHTHALMIC
Qty: 0 | Refills: 0 | DISCHARGE
Start: 2017-02-03

## 2017-02-03 RX ORDER — ASPIRIN/CALCIUM CARB/MAGNESIUM 324 MG
1 TABLET ORAL
Qty: 0 | Refills: 0 | DISCHARGE
Start: 2017-02-03

## 2017-02-03 RX ADMIN — Medication 1334 MILLIGRAM(S): at 07:39

## 2017-02-03 RX ADMIN — ERYTHROPOIETIN 10000 UNIT(S): 10000 INJECTION, SOLUTION INTRAVENOUS; SUBCUTANEOUS at 12:03

## 2017-02-03 RX ADMIN — Medication 4: at 16:02

## 2017-02-03 RX ADMIN — Medication 4: at 07:39

## 2017-02-03 RX ADMIN — Medication 81 MILLIGRAM(S): at 14:20

## 2017-02-03 RX ADMIN — BRIMONIDINE TARTRATE 1 DROP(S): 2 SOLUTION/ DROPS OPHTHALMIC at 05:35

## 2017-02-03 RX ADMIN — Medication 325 MILLIGRAM(S): at 14:19

## 2017-02-03 NOTE — PROGRESS NOTE ADULT - ASSESSMENT
D/C Clonidine. Monitor BP. CKD on HD. Continue stroke workup.
awake alert speechnfleunt arm leg 4/5 workup in progress  non focal exam
DM2 with hyperglycemia
DM2 with hyperglycemia

## 2017-02-03 NOTE — DIETITIAN INITIAL EVALUATION ADULT. - NS AS NUTRI INTERV ED CONTENT
Nutrition relationship to health/disease/encourage compliance to low sodium diet/Purpose of the nutrition education encourage compliance to low sodium diet & review renal replacement [facility] diet/Purpose of the nutrition education/Nutrition relationship to health/disease

## 2017-02-03 NOTE — DIETITIAN INITIAL EVALUATION ADULT. - PERTINENT MEDS FT
Ecotrin, Humalog (sliding scale) 3 x daily before meals, Norvasc, Phoslo, Calcium Carbonate 1250mg, Coreg, Catapres, Trusopt, Ferrous Sulfate 325mg, Cozaar

## 2017-02-03 NOTE — DIETITIAN INITIAL EVALUATION ADULT. - NS AS NUTRI INTERV MEALS SNACK
Mineral - modified diet/Fluid - modified diet/Recommend CCHO c evening snack renal replacement therapy diet & 1000mL fluid restriction/Carbohydrate - modified diet

## 2017-02-03 NOTE — DIETITIAN INITIAL EVALUATION ADULT. - DIET TYPE
renal replacement pts:no protein restr,no conc K & phos, low sodium/02/02/2017/DASH/TLC (sodium and cholesterol restricted diet)

## 2017-02-03 NOTE — DIETITIAN INITIAL EVALUATION ADULT. - NUTRITIONGOAL OUTCOME1
pt to verbalize importance of compliance to low sodium diet pt to verbalize importance of compliance to low sodium diet; verbailize 3 foods high in Na to avoid

## 2017-02-03 NOTE — PROGRESS NOTE ADULT - PROBLEM SELECTOR PROBLEM 1
Type 2 diabetes mellitus without complication, unspecified long term insulin use status
Type 2 diabetes mellitus without complication, unspecified long term insulin use status

## 2017-02-03 NOTE — DIETITIAN INITIAL EVALUATION ADULT. - ENERGY NEEDS
Ht: 152.4cm   Wt: 77.4kg (02/02)   BMI: 33.5   IBW: 45.5kg   %IBW: 155%  UBW: 80kg   %UBW: 97%   Dry wt: 77kg-78kg   %Dry wt: 100%

## 2017-02-03 NOTE — DIETITIAN INITIAL EVALUATION ADULT. - OTHER INFO
Pt was seen for HD RD consult. Pt was seen for HD RD consult.  Pt was consulted during HD appointment.  Pt reports good appetite & >75% intake of meals.  Pt reports no N/V/D/C chewing/swallowing issues.  Pt reports last BM 02/03 am.  Pt lives @ home c daughter & son; pt does food shopping & cooking.  To manage DM @ home, pt reports to checking FS "once in a while"; is currently off DM medication.  Pt reports to urinating in the morning.  Pt reports basic understanding on diabetic renal replacement diet.  Pt reports avoiding potatoes & high phos foods (such as excessive cheese).  Pt reports to using salt in food.  Reports consumption of scrambled eggs & 2 slices toast @ breakfast, Tuna sandwich @ lunch, & chx, string beans & rice @ dinner.  Pt reports to restricting fluid intake to 32oz per day. Pt was seen for HD RD consult.  Pt admitted 2/2 facial dropping during a HD appointment.  Pt admit dx of Transient Ishemic Attack/ESRD on dialysis.  Pt was consulted during HD appointment.  Pt reports good appetite & >75% intake of meals.  Pt reports no N/V/D/C chewing/swallowing issues.  Pt reports last BM 02/03 am.  Pt lives @ home c daughter & son; pt does food shopping & cooking.  To manage DM @ home, pt reports to checking FS "once in a while"; is currently off DM medication.  Pt reports some urine output in the morning.  Pt reports basic understanding on diabetic renal replacement diet.  Pt able to verbalize foods high in K+ & phos; potatoes, oranges, tomatoes, cheese.  Pt reports to using salt in food.  24 hr recall:  scrambled eggs & 2 slices toast @ breakfast, Tuna sandwich @ lunch, & chx, string beans & rice @ dinner.  Pt reports to restricting fluid intake to 32oz per day.

## 2017-02-03 NOTE — DIETITIAN INITIAL EVALUATION ADULT. - PERTINENT LABORATORY DATA
02-02 Na 143 mmol/L Glu 144 mg/dL<H> K+ 3.8 mmol/L Cr  10.20 mg/dL<H> BUN 50 mg/dL<H> Phos n/a   Alb n/a   PAB n/a   Hgb 9.3 g/dL<L> Hct 28.6 %<L> WBC 4.0, A1C 6.2 <H>, Cl 103, CO2 26, Ca 8.7, GFR 4 <L>

## 2017-02-03 NOTE — PROGRESS NOTE ADULT - SUBJECTIVE AND OBJECTIVE BOX
Patient is a 76y old  Female who presents with a chief complaint of Facial drooping (01 Feb 2017 20:21)      INTERVAL HPI/OVERNIGHT EVENTS:  patient with no complaints  eating well  good appetite  says was off OHA recently and controlled    MEDICATIONS  (STANDING):  losartan 100milliGRAM(s) Oral daily  calcium carbonate 1250 mG (OsCal) 4Tablet(s) Oral daily  cloNIDine 0.1milliGRAM(s) Oral every 12 hours  carvedilol 12.5milliGRAM(s) Oral every 12 hours  amLODIPine   Tablet 10milliGRAM(s) Oral daily  ferrous    sulfate 325milliGRAM(s) Oral daily  brimonidine 0.2% Ophthalmic Solution 1Drop(s) Both EYES two times a day  dorzolamide 2% Ophthalmic Solution 1Drop(s) Both EYES at bedtime  calcium acetate 1334milliGRAM(s) Oral three times a day with meals  aspirin enteric coated 81milliGRAM(s) Oral daily  insulin lispro (HumaLOG) corrective regimen sliding scale  SubCutaneous three times a day before meals  insulin lispro (HumaLOG) corrective regimen sliding scale  SubCutaneous at bedtime  dextrose 5%. 1000milliLiter(s) IV Continuous <Continuous>  dextrose 50% Injectable 12.5Gram(s) IV Push once  dextrose 50% Injectable 25Gram(s) IV Push once  dextrose 50% Injectable 25Gram(s) IV Push once    MEDICATIONS  (PRN):  dextrose Gel 1Dose(s) Oral once PRN Blood Glucose LESS THAN 70 milliGRAM(s)/deciliter  glucagon  Injectable 1milliGRAM(s) IntraMuscular once PRN Glucose LESS THAN 70 milligrams/deciliter      REVIEW OF SYSTEMS:  CONSTITUTIONAL: No fever, weight loss, or fatigue  RESPIRATORY: No cough, wheezing, chills or hemoptysis; No shortness of breath  CARDIOVASCULAR: No chest pain, palpitations, dizziness, or leg swelling  GASTROINTESTINAL: No abdominal or epigastric pain. No nausea, vomiting, or hematemesis; No diarrhea or constipation. No melena or hematochezia.  ENDOCRINE: No heat or cold intolerance; No hair loss      Vital Signs Last 24 Hrs  T(C): 36.6, Max: 36.7 (02-01 @ 16:30)  T(F): 97.8, Max: 98.1 (02-01 @ 16:30)  HR: 69 (64 - 85)  BP: 126/54 (112/53 - 141/61)  BP(mean): --  RR: 20 (16 - 20)  SpO2: 99% (22% - 100%)    PHYSICAL EXAM:  GENERAL: NAD, well-groomed, well-developed  CHEST/LUNG: Clear to percussion bilaterally; No rales, rhonchi, wheezing, or rubs  HEART: Regular rate and rhythm; No murmurs, rubs, or gallops  ABDOMEN: Soft, Nontender, Nondistended; Bowel sounds present        LABS:                        9.3    4.0   )-----------( 141      ( 02 Feb 2017 08:04 )             28.6     02 Feb 2017 08:04    143    |  103    |  50     ----------------------------<  144    3.8     |  26     |  10.20    Ca    8.7        02 Feb 2017 08:04    TPro  7.6    /  Alb  3.5    /  TBili  0.4    /  DBili  x      /  AST  21     /  ALT  19     /  AlkPhos  80     01 Feb 2017 17:37    PT/INR - ( 01 Feb 2017 17:37 )   PT: 12.2 sec;   INR: 1.09 ratio         PTT - ( 01 Feb 2017 17:37 )  PTT:31.3 sec    CAPILLARY BLOOD GLUCOSE  152 (02 Feb 2017 08:00)  193 (01 Feb 2017 21:55)    Lipid panel:   CARDIAC MARKERS ( 01 Feb 2017 17:37 )  <.015 ng/mL / x     / 66 U/L / x     / <0.5 ng/mL
Patient is a 76y old  Female who presents with a chief complaint of Facial drooping (02 Feb 2017 09:09)      INTERVAL HPI/OVERNIGHT EVENTS:  pt at dialysis, NAD    MEDICATIONS  (STANDING):  losartan 100milliGRAM(s) Oral daily  calcium carbonate 1250 mG (OsCal) 4Tablet(s) Oral daily  carvedilol 12.5milliGRAM(s) Oral every 12 hours  amLODIPine   Tablet 10milliGRAM(s) Oral daily  ferrous    sulfate 325milliGRAM(s) Oral daily  brimonidine 0.2% Ophthalmic Solution 1Drop(s) Both EYES two times a day  dorzolamide 2% Ophthalmic Solution 1Drop(s) Both EYES at bedtime  calcium acetate 1334milliGRAM(s) Oral three times a day with meals  aspirin enteric coated 81milliGRAM(s) Oral daily  insulin lispro (HumaLOG) corrective regimen sliding scale  SubCutaneous three times a day before meals  insulin lispro (HumaLOG) corrective regimen sliding scale  SubCutaneous at bedtime  dextrose 5%. 1000milliLiter(s) IV Continuous <Continuous>  dextrose 50% Injectable 12.5Gram(s) IV Push once  dextrose 50% Injectable 25Gram(s) IV Push once  dextrose 50% Injectable 25Gram(s) IV Push once    MEDICATIONS  (PRN):  dextrose Gel 1Dose(s) Oral once PRN Blood Glucose LESS THAN 70 milliGRAM(s)/deciliter  glucagon  Injectable 1milliGRAM(s) IntraMuscular once PRN Glucose LESS THAN 70 milligrams/deciliter      REVIEW OF SYSTEMS:  CONSTITUTIONAL: No fever, weight loss, or fatigue  RESPIRATORY: No cough, wheezing, chills or hemoptysis; No shortness of breath  CARDIOVASCULAR: No chest pain, palpitations, dizziness, or leg swelling  GASTROINTESTINAL: No abdominal or epigastric pain. No nausea, vomiting, or hematemesis; No diarrhea or constipation. No melena or hematochezia.  ENDOCRINE: No heat or cold intolerance; No hair loss      Vital Signs Last 24 Hrs  T(C): 36.6, Max: 36.7 (02-02 @ 16:15)  T(F): 97.8, Max: 98 (02-02 @ 16:15)  HR: 65 (63 - 69)  BP: 98/44 (91/41 - 105/45)  BP(mean): --  RR: 16 (16 - 17)  SpO2: 97% (95% - 100%)    PHYSICAL EXAM:  GENERAL: NAD, well-groomed, well-developed      LABS:                        9.3    4.0   )-----------( 141      ( 02 Feb 2017 08:04 )             28.6     02 Feb 2017 08:04    143    |  103    |  50     ----------------------------<  144    3.8     |  26     |  10.20    Ca    8.7        02 Feb 2017 08:04    TPro  7.6    /  Alb  3.5    /  TBili  0.4    /  DBili  x      /  AST  21     /  ALT  19     /  AlkPhos  80     01 Feb 2017 17:37    PT/INR - ( 01 Feb 2017 17:37 )   PT: 12.2 sec;   INR: 1.09 ratio         PTT - ( 01 Feb 2017 17:37 )  PTT:31.3 sec    CAPILLARY BLOOD GLUCOSE  244 (03 Feb 2017 07:41)  127 (02 Feb 2017 21:15)  218 (02 Feb 2017 11:06)    Lipid panel:   CARDIAC MARKERS ( 01 Feb 2017 17:37 )  <.015 ng/mL / x     / 66 U/L / x     / <0.5 ng/mL
Patient seen in follow up for ESRD; BP's noted.  Of note, patient was only only on amlodipine 5 mg daily at home; she was no longer taking Coreg, clonidine and losartan.    MEDICATIONS  (STANDING):  calcium carbonate 1250 mG (OsCal) 4Tablet(s) Oral daily  ferrous    sulfate 325milliGRAM(s) Oral daily  brimonidine 0.2% Ophthalmic Solution 1Drop(s) Both EYES two times a day  dorzolamide 2% Ophthalmic Solution 1Drop(s) Both EYES at bedtime  calcium acetate 1334milliGRAM(s) Oral three times a day with meals  aspirin enteric coated 81milliGRAM(s) Oral daily  insulin lispro (HumaLOG) corrective regimen sliding scale  SubCutaneous three times a day before meals  insulin lispro (HumaLOG) corrective regimen sliding scale  SubCutaneous at bedtime  dextrose 5%. 1000milliLiter(s) IV Continuous <Continuous>  dextrose 50% Injectable 12.5Gram(s) IV Push once  dextrose 50% Injectable 25Gram(s) IV Push once  dextrose 50% Injectable 25Gram(s) IV Push once  epoetin wood Injectable 68458Fcra(s) IV Push once    MEDICATIONS  (PRN):  dextrose Gel 1Dose(s) Oral once PRN Blood Glucose LESS THAN 70 milliGRAM(s)/deciliter  glucagon  Injectable 1milliGRAM(s) IntraMuscular once PRN Glucose LESS THAN 70 milligrams/deciliter    PHYSICAL EXAM:      T(C): 36.6, Max: 36.7 (02-02 @ 16:15)  HR: 65 (63 - 69)  BP: 98/44 (91/41 - 105/45)  RR: 16 (16 - 17)  SpO2: 97% (95% - 100%)  Wt(kg): --  Respiratory: clear anteriorly, decreased BS at bases  Cardiovascular: S1 S2  Gastrointestinal: soft NT ND +BS  Extremities:    no edema                                    9.3    4.0   )-----------( 141      ( 02 Feb 2017 08:04 )             28.6     02 Feb 2017 08:04    143    |  103    |  50     ----------------------------<  144    3.8     |  26     |  10.20    Ca    8.7        02 Feb 2017 08:04    TPro  7.6    /  Alb  3.5    /  TBili  0.4    /  DBili  x      /  AST  21     /  ALT  19     /  AlkPhos  80     01 Feb 2017 17:37      LIVER FUNCTIONS - ( 01 Feb 2017 17:37 )  Alb: 3.5 g/dL / Pro: 7.6 gm/dL / ALK PHOS: 80 U/L / ALT: 19 U/L / AST: 21 U/L / GGT: x             Assessment and Plan:  Maintenance HD  UF zero; +_500  Stop all BP medications for now and at at discharge.  Echo noted, d/c home today pending HD rx.
Subjective: feels well. No complaints. Denies dizziness, lightheadedness, SOB      MEDICATIONS  (STANDING):  losartan 100milliGRAM(s) Oral daily  calcium carbonate 1250 mG (OsCal) 4Tablet(s) Oral daily  cloNIDine 0.1milliGRAM(s) Oral every 12 hours  carvedilol 12.5milliGRAM(s) Oral every 12 hours  amLODIPine   Tablet 10milliGRAM(s) Oral daily  ferrous    sulfate 325milliGRAM(s) Oral daily  brimonidine 0.2% Ophthalmic Solution 1Drop(s) Both EYES two times a day  dorzolamide 2% Ophthalmic Solution 1Drop(s) Both EYES at bedtime  calcium acetate 1334milliGRAM(s) Oral three times a day with meals  aspirin enteric coated 81milliGRAM(s) Oral daily  insulin lispro (HumaLOG) corrective regimen sliding scale  SubCutaneous three times a day before meals  insulin lispro (HumaLOG) corrective regimen sliding scale  SubCutaneous at bedtime  dextrose 5%. 1000milliLiter(s) IV Continuous <Continuous>  dextrose 50% Injectable 12.5Gram(s) IV Push once  dextrose 50% Injectable 25Gram(s) IV Push once  dextrose 50% Injectable 25Gram(s) IV Push once    MEDICATIONS  (PRN):  dextrose Gel 1Dose(s) Oral once PRN Blood Glucose LESS THAN 70 milliGRAM(s)/deciliter  glucagon  Injectable 1milliGRAM(s) IntraMuscular once PRN Glucose LESS THAN 70 milligrams/deciliter          T(C): 36.2, Max: 36.7 (02-01 @ 16:30)  HR: 68 (64 - 85)  BP: 105/45 (105/45 - 141/61)  RR: 17 (16 - 20)  SpO2: 100% (22% - 100%)  Wt(kg): --        I&O's Detail    I & Os for current day (as of 02 Feb 2017 10:48)  =============================================  IN:    Oral Fluid: 360 ml    Total IN: 360 ml  ---------------------------------------------  OUT:    Total OUT: 0 ml  ---------------------------------------------  Total NET: 360 ml           PHYSICAL EXAM:    GENERAL: comfortable  EYES: EOMI, PERRLA, conjunctiva and sclera clear  NECK: Supple, no inc in JVP  CHEST/LUNG: Clear  HEART: S1S2  ABDOMEN: Soft, Nontender, Nondistended; Bowel sounds present  EXTREMITIES:  no edema  NEURO: no asterixis  ACCESS: L AVF pos thrill, bruit      LABS:  CBC Full  -  ( 02 Feb 2017 08:04 )  WBC Count : 4.0 K/uL  Hemoglobin : 9.3 g/dL  Hematocrit : 28.6 %  Platelet Count - Automated : 141 K/uL  Mean Cell Volume : 100.4 fl  Mean Cell Hemoglobin : 32.6 pg  Mean Cell Hemoglobin Concentration : 32.5 gm/dL  Auto Neutrophil # : 2.3 K/uL  Auto Lymphocyte # : 1.1 K/uL  Auto Monocyte # : 0.4 K/uL  Auto Eosinophil # : 0.1 K/uL  Auto Basophil # : 0.0 K/uL  Auto Neutrophil % : 59.2 %  Auto Lymphocyte % : 28.2 %  Auto Monocyte % : 9.1 %  Auto Eosinophil % : 2.3 %  Auto Basophil % : 1.2 %    02 Feb 2017 08:04    143    |  103    |  50     ----------------------------<  144    3.8     |  26     |  10.20    Ca    8.7        02 Feb 2017 08:04    TPro  7.6    /  Alb  3.5    /  TBili  0.4    /  DBili  x      /  AST  21     /  ALT  19     /  AlkPhos  80     01 Feb 2017 17:37    PT/INR - ( 01 Feb 2017 17:37 )   PT: 12.2 sec;   INR: 1.09 ratio         PTT - ( 01 Feb 2017 17:37 )  PTT:31.3 sec        ASSESSMENT and PLAN:    * Hypotension at start of outpt HD on 2/1. Cardiac/ischemic w/u in progress  * No dialytic indication today. Next HD 2/3 as Rxed.
Patient is a 76y old  Female who presents with a chief complaint of Facial drooping (02 Feb 2017 09:09)    MEDICAL PROBLEMS:  TRANSIENT ISCHEMIC ATTACK/ ESRD ON HEMODIALYSIS  POSS STROKE  ESRD on hemodialysis  Anemia  Obesity (BMI 30-39.9)  Glaucoma  DM (diabetes mellitus)  CKD (chronic kidney disease)  HTN (hypertension)  TIA (transient ischemic attack)  Type 2 diabetes mellitus without complication, unspecified long term insulin use status  ESRD on hemodialysis      INTERVAL HPI/OVERNIGHT EVENTS: Alert, still hypotensive. No more facial drooping.    MEDICATIONS  (STANDING):  losartan 100milliGRAM(s) Oral daily  calcium carbonate 1250 mG (OsCal) 4Tablet(s) Oral daily  carvedilol 12.5milliGRAM(s) Oral every 12 hours  amLODIPine   Tablet 10milliGRAM(s) Oral daily  ferrous    sulfate 325milliGRAM(s) Oral daily  brimonidine 0.2% Ophthalmic Solution 1Drop(s) Both EYES two times a day  dorzolamide 2% Ophthalmic Solution 1Drop(s) Both EYES at bedtime  calcium acetate 1334milliGRAM(s) Oral three times a day with meals  aspirin enteric coated 81milliGRAM(s) Oral daily  insulin lispro (HumaLOG) corrective regimen sliding scale  SubCutaneous three times a day before meals  insulin lispro (HumaLOG) corrective regimen sliding scale  SubCutaneous at bedtime  dextrose 5%. 1000milliLiter(s) IV Continuous <Continuous>  dextrose 50% Injectable 12.5Gram(s) IV Push once  dextrose 50% Injectable 25Gram(s) IV Push once  dextrose 50% Injectable 25Gram(s) IV Push once    MEDICATIONS  (PRN):  dextrose Gel 1Dose(s) Oral once PRN Blood Glucose LESS THAN 70 milliGRAM(s)/deciliter  glucagon  Injectable 1milliGRAM(s) IntraMuscular once PRN Glucose LESS THAN 70 milligrams/deciliter    Allergies    No Known Allergies    Intolerances      Vital Signs Last 24 Hrs  T(C): 36.7, Max: 36.7 (02-01 @ 20:56)  T(F): 98, Max: 98 (02-01 @ 20:56)  HR: 63 (63 - 84)  BP: 91/41 (91/41 - 141/61)  BP(mean): --  RR: 17 (16 - 20)  SpO2: 99% (22% - 100%)    PHYSICAL EXAM:  GENERAL: NAD, well-groomed, well-developed  HEAD:  Atraumatic, Normocephalic  EYES: EOMI, PERRLA, conjunctiva and sclera clear  ENMT: No tonsillar erythema, exudates, or enlargement; Moist mucous membranes, Good dentition, No lesions  NECK: Supple, No JVD, Normal thyroid  NERVOUS SYSTEM:  Alert & Oriented X3, Good concentration; Motor Strength 5/5 B/L upper and lower extremities; DTRs 2+ intact and symmetric  CHEST/LUNG: Clear to percussion bilaterally; No rales, rhonchi, wheezing, or rubs  HEART: Regular rate and rhythm; No murmurs, rubs, or gallops  ABDOMEN: Soft, Nontender, Nondistended; Bowel sounds present  EXTREMITIES:  2+ Peripheral Pulses, No clubbing, cyanosis, or edema  LYMPH: No lymphadenopathy noted  SKIN: No rashes or lesions    I & Os for current day (as of 02-02 @ 19:00)  =============================================  IN: 600 ml / OUT: 0 ml / NET: 600 ml      LABS:                        9.3    4.0   )-----------( 141      ( 02 Feb 2017 08:04 )             28.6     02 Feb 2017 08:04    143    |  103    |  50     ----------------------------<  144    3.8     |  26     |  10.20    Ca    8.7        02 Feb 2017 08:04    TPro  7.6    /  Alb  3.5    /  TBili  0.4    /  DBili  x      /  AST  21     /  ALT  19     /  AlkPhos  80     01 Feb 2017 17:37    PT/INR - ( 01 Feb 2017 17:37 )   PT: 12.2 sec;   INR: 1.09 ratio         PTT - ( 01 Feb 2017 17:37 )  PTT:31.3 sec    CAPILLARY BLOOD GLUCOSE  218 (02 Feb 2017 11:06)  152 (02 Feb 2017 08:00)  193 (01 Feb 2017 21:55)    RADIOLOGY & ADDITIONAL TESTS:    Imaging Personally Reviewed:  [X] YES  [ ] NO    Consultant(s) Notes Reviewed:  [X] YES  [ ] NO    Care Discussed with Consultants/Other Providers [X] YES  [ ] NO

## 2017-02-03 NOTE — PROGRESS NOTE ADULT - PROBLEM SELECTOR PLAN 1
fluctuating glucose levels, ha1c 6.2%  continue VERONIKA with meals alone while inpt , if fsbg persist >180 will consider low dose lantus  pt diet controlled as outpt will not need insulin upon d/c
currently controlled  continue VERONIKA with meals alone while inpt   f/u ha1c

## 2017-02-07 DIAGNOSIS — N18.6 END STAGE RENAL DISEASE: ICD-10-CM

## 2017-02-07 DIAGNOSIS — I95.9 HYPOTENSION, UNSPECIFIED: ICD-10-CM

## 2017-02-07 DIAGNOSIS — G45.9 TRANSIENT CEREBRAL ISCHEMIC ATTACK, UNSPECIFIED: ICD-10-CM

## 2017-02-07 DIAGNOSIS — E11.22 TYPE 2 DIABETES MELLITUS WITH DIABETIC CHRONIC KIDNEY DISEASE: ICD-10-CM

## 2017-02-07 DIAGNOSIS — H40.9 UNSPECIFIED GLAUCOMA: ICD-10-CM

## 2017-02-07 DIAGNOSIS — I45.10 UNSPECIFIED RIGHT BUNDLE-BRANCH BLOCK: ICD-10-CM

## 2017-02-07 DIAGNOSIS — I12.0 HYPERTENSIVE CHRONIC KIDNEY DISEASE WITH STAGE 5 CHRONIC KIDNEY DISEASE OR END STAGE RENAL DISEASE: ICD-10-CM

## 2017-02-07 DIAGNOSIS — E66.9 OBESITY, UNSPECIFIED: ICD-10-CM

## 2017-02-07 DIAGNOSIS — Z99.2 DEPENDENCE ON RENAL DIALYSIS: ICD-10-CM

## 2017-02-07 DIAGNOSIS — E88.81 METABOLIC SYNDROME AND OTHER INSULIN RESISTANCE: ICD-10-CM

## 2017-02-07 DIAGNOSIS — Z90.49 ACQUIRED ABSENCE OF OTHER SPECIFIED PARTS OF DIGESTIVE TRACT: ICD-10-CM

## 2017-02-07 DIAGNOSIS — E11.65 TYPE 2 DIABETES MELLITUS WITH HYPERGLYCEMIA: ICD-10-CM

## 2017-02-07 LAB
CULTURE RESULTS: SIGNIFICANT CHANGE UP
CULTURE RESULTS: SIGNIFICANT CHANGE UP
SPECIMEN SOURCE: SIGNIFICANT CHANGE UP
SPECIMEN SOURCE: SIGNIFICANT CHANGE UP

## 2018-03-24 ENCOUNTER — EMERGENCY (EMERGENCY)
Facility: HOSPITAL | Age: 78
LOS: 0 days | Discharge: HOME | End: 2018-03-25
Attending: EMERGENCY MEDICINE
Payer: MEDICARE

## 2018-03-24 VITALS
HEIGHT: 63 IN | OXYGEN SATURATION: 95 % | DIASTOLIC BLOOD PRESSURE: 51 MMHG | SYSTOLIC BLOOD PRESSURE: 127 MMHG | WEIGHT: 160.06 LBS | HEART RATE: 80 BPM | TEMPERATURE: 98 F | RESPIRATION RATE: 16 BRPM

## 2018-03-24 DIAGNOSIS — I10 ESSENTIAL (PRIMARY) HYPERTENSION: ICD-10-CM

## 2018-03-24 DIAGNOSIS — Z99.2 DEPENDENCE ON RENAL DIALYSIS: ICD-10-CM

## 2018-03-24 DIAGNOSIS — D64.9 ANEMIA, UNSPECIFIED: ICD-10-CM

## 2018-03-24 DIAGNOSIS — S80.211A ABRASION, RIGHT KNEE, INITIAL ENCOUNTER: ICD-10-CM

## 2018-03-24 DIAGNOSIS — N18.9 CHRONIC KIDNEY DISEASE, UNSPECIFIED: ICD-10-CM

## 2018-03-24 DIAGNOSIS — S00.83XA CONTUSION OF OTHER PART OF HEAD, INITIAL ENCOUNTER: ICD-10-CM

## 2018-03-24 DIAGNOSIS — J32.9 CHRONIC SINUSITIS, UNSPECIFIED: ICD-10-CM
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
## 2018-03-24 DIAGNOSIS — H40.9 UNSPECIFIED GLAUCOMA: ICD-10-CM

## 2018-03-24 DIAGNOSIS — N18.6 END STAGE RENAL DISEASE: ICD-10-CM
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
## 2018-03-24 DIAGNOSIS — E66.9 OBESITY, UNSPECIFIED: ICD-10-CM

## 2018-03-24 PROCEDURE — 99284 EMERGENCY DEPT VISIT MOD MDM: CPT

## 2018-03-24 PROCEDURE — 70450 CT HEAD/BRAIN W/O DYE: CPT | Mod: 26

## 2018-03-24 NOTE — ED PROVIDER NOTE - MUSCULOSKELETAL, MLM
Spine appears normal, range of motion is not limited, no muscle or joint tenderness, no C/T/L spine tenderness, minor abrasion of R knee, superficial .5cm no joint swelling, ROM intact and full without any difficulty

## 2018-03-24 NOTE — ED ADULT TRIAGE NOTE - CHIEF COMPLAINT QUOTE
BIBA,  trip/fall.  denies LOC,   [place], stated she thinks she tripped over her shoe lace.   Hematoma L-brow.  denies headache, denies pain

## 2018-03-24 NOTE — ED PROVIDER NOTE - OBJECTIVE STATEMENT
78 year old female with PMH of glaucoma, HTN, ESRD MWF, presenting to ED due to fall - states had tripped on shoelace and hit left side of forehead, also landing on R knee with minor abrasion to area. Denies any LOC, no neck pain or back pain noted.

## 2018-03-24 NOTE — ED PROVIDER NOTE - CARE PLAN
Principal Discharge DX:	Contusion of forehead  Secondary Diagnosis:	Knee abrasion, right, initial encounter  Secondary Diagnosis:	Sinusitis

## 2018-03-25 PROBLEM — N18.6 END STAGE RENAL DISEASE: Chronic | Status: ACTIVE | Noted: 2017-02-01

## 2018-03-25 RX ADMIN — Medication 1 TABLET(S): at 00:18

## 2018-10-18 NOTE — ED PROVIDER NOTE - SECONDARY DIAGNOSIS.
management. They have remained hemodynamically stable throughout their entire ED visit and are stable for discharge with outpatient follow-up. The plan has been discussed in detail and they are aware of the specific conditions for emergent return, as well as the importance of follow-up. New Prescriptions    No medications on file       Diagnosis:  1. Chest pain, unspecified type    2. Lymphadenopathy        Disposition:  Patient's disposition: Discharge to home  Patient's condition is stable.              Angelica Moncada,   10/18/18 1147 ESRD on hemodialysis

## 2018-12-19 NOTE — ED ADULT NURSE NOTE - GASTROINTESTINAL ASSESSMENT
December 29, 2018     Date of Visit: 12/19/2018     Patient: Ryan Pérez   YOB: 1958   Address: [address] [place] [address]   Phone Number: [phone]       To Whom it May Concern:    Ryan Pérez is presently under our care for diabetes mellitus,chronic obstructive pulmonary disease and obstructive sleep apnea. We recommend that the electricity to this home be continued as discontinuation of this service would pose a health hazard for the patient. The equipment he utilizes to treat his ailments require electricity. He is a permanent resident at the address listed in this letter and his comed account number is [phone].    If you have any questions or concerns, please don't hesitate to call.      Sincerely,       Dr. Davonte Urrutia  [facility]              
WDL

## 2019-05-10 NOTE — ED ADULT NURSE NOTE - HARM RISK FACTORS
Patient Education        teriparatide  Pronunciation:  ter i PAR a tide  Brand: Fort[employer]  What is the most important information I should know about teriparatide? Teriparatide has caused bone cancer (osteosarcoma) in animals but it is not known whether this would occur in people using this medicine. Talk with your doctor about your own risk. Tell your doctor if you have conditions such as Paget's disease, high blood levels of calcium or alkaline phosphatase, or a history of bone cancer or radiation treatment involving your bones. Avoid smoking cigarettes or drinking alcohol. Smoking or heavy drinking can reduce your bone mineral density, making fractures more likely. What is teriparatide? Teriparatide is a man-made form of parathyroid hormone that exists naturally in the body. Teriparatide increases bone mineral density and bone strength, which may prevent fractures. Teriparatide is used to treat osteoporosis caused by menopause, steroid use, or gonadal failure. This medicine is for use when you have a high risk of bone fracture due to osteoporosis. Teriparatide may also be used for purposes not listed in this medication guide. What should I discuss with my healthcare provider before using teriparatide? You should not use teriparatide if you are allergic to it. Teriparatide is not for use in children or young adults whose bones are still growing. To make sure teriparatide is safe for you, tell your doctor if you have ever had:  · Paget's disease or a bone disease other than osteoporosis;  · high blood levels of calcium or alkaline phosphatase;  · overactive parathyroid glands;  · bone cancer or radiation treatment involving your bones; or  · a kidney stone. Teriparatide has caused bone cancer (osteosarcoma) in animals but it is not known whether this would occur in people using this medicine. Talk with your doctor about your own risk.   While using teriparatide, you may be asked to provide your name to a patient registry. This is to collect information about any possible risk of bone cancer in people who take this medicine. It is not known whether this medicine will harm an unborn baby. Tell your doctor if you are pregnant or plan to become pregnant. It is not known whether teriparatide passes into breast milk or if it could harm a nursing baby. You should not breast-feed while using this medicine. How should I use teriparatide? Follow all directions on your prescription label. Do not use this medicine in larger or smaller amounts or for longer than recommended. Teriparatide is injected under the skin. You may be shown how to use injections at home. Do not give yourself this medicine if you do not understand how to use the injection and properly dispose of used needles and syringes. Teriparatide is usually given once per day. Use the medicine at the same time each day. Your care provider will show you the best places on your body to inject teriparatide. Use a different place each time you give an injection. Do not inject into the same place two times in a row. Use only the injection pen provided with teriparatide. Do not transfer the medicine to a syringe or other injection device or you could get an overdose. Teriparatide can cause you to feel dizzy or light-headed. Always give your injection at a time and place where you can sit or lie down for a short time afterward. Each prefilled injection pen contains enough teriparatide for 28 separate injections. Throw the pen away after 28 injections, even if there is still medicine left inside. Teriparatide should be clear and colorless. Do not use the medicine if it has changed colors, looks cloudy, or has particles in it. Call your pharmacist for new medicine. Use a disposable needle only once. Follow any state or local laws about throwing away used needles.  Use a puncture-proof \"sharps\" disposal container (ask your pharmacist where to get one and how to throw it away). Keep this container out of the reach of children and pets. Read all patient information, medication guides, and instruction sheets provided to you. Ask your doctor or pharmacist if you have any questions. Teriparatide may be only part of a complete program of treatment that also includes diet changes, exercise, taking vitamin or mineral supplements, and changing certain behaviors. Follow your doctor's instructions very closely. Store your teriparatide injection pen in the refrigerator when not in use. Take the pen out of the refrigerator only long enough to use it. After use, remove the needle, recap the pen, and return it to the refrigerator. Do not freeze teriparatide, and throw away the medicine if it has become frozen. Your doctor will determine how long to treat you with this medicine. Teriparatide is often given for only 2 years. What happens if I miss a dose? Use the missed dose on the same day you remember it. Use your next dose at the regular time and stay on your once-daily schedule. Do not use 2 doses in one day. What happens if I overdose? Seek emergency medical attention or call the Poison Help line at 1-744.820.3318. Overdose symptoms may include nausea, vomiting, dizziness, headache, feeling light-headed, or fainting. What should I avoid while using teriparatide? Teriparatide can cause side effects that may impair your thinking or reactions. Be careful if you drive or do anything that requires you to be awake and alert. Avoid smoking, or try to quit. Smoking can reduce your bone mineral density, making fractures more likely. Avoid drinking large amounts of alcohol. Heavy drinking can also cause bone loss. What are the possible side effects of teriparatide? Get emergency medical help if you have signs of an allergic reaction: hives; difficult breathing; swelling of your face, lips, tongue, or throat.   Call your doctor at once if you have:  · a light-headed feeling, like you might pass out (may occur within 4 hours after injection);  · pounding heartbeats or fluttering in your chest after using an injection; or  · high levels of calcium in your blood --nausea, vomiting, constipation, muscle weakness, lack of energy, or tired feeling. Common side effects may include:  · nausea;  · joint pain; or  · pain anywhere in your body. This is not a complete list of side effects and others may occur. Call your doctor for medical advice about side effects. You may report side effects to FDA at 1-097-FDA-7175. What other drugs will affect teriparatide? Tell your doctor about all your current medicines and any you start or stop using, especially:  · digoxin, digitalis. This list is not complete. Other drugs may interact with teriparatide, including prescription and over-the-counter medicines, vitamins, and herbal products. Not all possible interactions are listed in this medication guide. Where can I get more information? Your pharmacist can provide more information about teriparatide. Remember, keep this and all other medicines out of the reach of children, never share your medicines with others, and use this medication only for the indication prescribed. Every effort has been made to ensure that the information provided by Nancy Morales [address] is accurate, up-to-date, and complete, but no guarantee is made to that effect. Drug information contained herein may be time sensitive. [employer] information has been compiled for use by healthcare practitioners and consumers in the [place] and therefore [employer] does not warrant that uses outside of the [place] are appropriate, unless specifically indicated otherwise. [facility]'s drug information does not endorse drugs, diagnose patients or recommend therapy.  [facility]'s drug information is an informational resource designed to assist licensed healthcare practitioners in caring for their patients and/or to serve consumers viewing this service as a supplement to, and not a substitute for, the expertise, skill, knowledge and judgment of healthcare practitioners. The absence of a warning for a given drug or drug combination in no way should be construed to indicate that the drug or drug combination is safe, effective or appropriate for any given patient. [facility] does not assume any responsibility for any aspect of healthcare administered with the aid of information [facility] provides. The information contained herein is not intended to cover all possible uses, directions, precautions, warnings, drug interactions, allergic reactions, or adverse effects. If you have questions about the drugs you are taking, check with your doctor, nurse or pharmacist.  Copyright 1873-7174 [address]: 2.02. Revision date: 12/4/2017. Care instructions adapted under license by [facility] . If you have questions about a medical condition or this instruction, always ask your healthcare professional. [place] [address] any warranty or liability for your use of this information. no

## 2020-01-01 ENCOUNTER — INPATIENT (INPATIENT)
Facility: HOSPITAL | Age: 80
LOS: 1 days | Discharge: HOME | End: 2020-01-03
Attending: INTERNAL MEDICINE | Admitting: INTERNAL MEDICINE
Payer: MEDICARE

## 2020-01-01 VITALS
RESPIRATION RATE: 20 BRPM | OXYGEN SATURATION: 100 % | DIASTOLIC BLOOD PRESSURE: 52 MMHG | WEIGHT: 190.04 LBS | HEART RATE: 80 BPM | TEMPERATURE: 98 F | SYSTOLIC BLOOD PRESSURE: 120 MMHG

## 2020-01-01 LAB
ALBUMIN SERPL ELPH-MCNC: 3.6 G/DL — SIGNIFICANT CHANGE UP (ref 3.3–5)
ALP SERPL-CCNC: 99 U/L — SIGNIFICANT CHANGE UP (ref 40–120)
ALT FLD-CCNC: 20 U/L — SIGNIFICANT CHANGE UP (ref 12–78)
ANION GAP SERPL CALC-SCNC: 15 MMOL/L — SIGNIFICANT CHANGE UP (ref 5–17)
ANISOCYTOSIS BLD QL: SLIGHT — SIGNIFICANT CHANGE UP
APTT BLD: 24.3 SEC — LOW (ref 27.5–36.3)
AST SERPL-CCNC: 14 U/L — LOW (ref 15–37)
BASOPHILS # BLD AUTO: 0.05 K/UL — SIGNIFICANT CHANGE UP (ref 0–0.2)
BASOPHILS NFR BLD AUTO: 1.1 % — SIGNIFICANT CHANGE UP (ref 0–2)
BILIRUB SERPL-MCNC: 0.3 MG/DL — SIGNIFICANT CHANGE UP (ref 0.2–1.2)
BUN SERPL-MCNC: 32 MG/DL — HIGH (ref 7–23)
CALCIUM SERPL-MCNC: 9.9 MG/DL — SIGNIFICANT CHANGE UP (ref 8.5–10.1)
CHLORIDE SERPL-SCNC: 100 MMOL/L — SIGNIFICANT CHANGE UP (ref 96–108)
CO2 SERPL-SCNC: 27 MMOL/L — SIGNIFICANT CHANGE UP (ref 22–31)
CREAT SERPL-MCNC: 7.37 MG/DL — HIGH (ref 0.5–1.3)
EOSINOPHIL # BLD AUTO: 0.07 K/UL — SIGNIFICANT CHANGE UP (ref 0–0.5)
EOSINOPHIL NFR BLD AUTO: 1.5 % — SIGNIFICANT CHANGE UP (ref 0–6)
GLUCOSE SERPL-MCNC: 161 MG/DL — HIGH (ref 70–99)
HCT VFR BLD CALC: 36.3 % — SIGNIFICANT CHANGE UP (ref 34.5–45)
HGB BLD-MCNC: 10.8 G/DL — LOW (ref 11.5–15.5)
IMM GRANULOCYTES NFR BLD AUTO: 0.4 % — SIGNIFICANT CHANGE UP (ref 0–1.5)
INR BLD: 1.07 RATIO — SIGNIFICANT CHANGE UP (ref 0.88–1.16)
LYMPHOCYTES # BLD AUTO: 1.13 K/UL — SIGNIFICANT CHANGE UP (ref 1–3.3)
LYMPHOCYTES # BLD AUTO: 24.8 % — SIGNIFICANT CHANGE UP (ref 13–44)
MACROCYTES BLD QL: SLIGHT — SIGNIFICANT CHANGE UP
MANUAL SMEAR VERIFICATION: SIGNIFICANT CHANGE UP
MCHC RBC-ENTMCNC: 29.8 GM/DL — LOW (ref 32–36)
MCHC RBC-ENTMCNC: 31.6 PG — SIGNIFICANT CHANGE UP (ref 27–34)
MCV RBC AUTO: 106.1 FL — HIGH (ref 80–100)
MONOCYTES # BLD AUTO: 0.38 K/UL — SIGNIFICANT CHANGE UP (ref 0–0.9)
MONOCYTES NFR BLD AUTO: 8.3 % — SIGNIFICANT CHANGE UP (ref 2–14)
NEUTROPHILS # BLD AUTO: 2.91 K/UL — SIGNIFICANT CHANGE UP (ref 1.8–7.4)
NEUTROPHILS NFR BLD AUTO: 63.9 % — SIGNIFICANT CHANGE UP (ref 43–77)
NRBC # BLD: 0 /100 WBCS — SIGNIFICANT CHANGE UP (ref 0–0)
PLAT MORPH BLD: NORMAL — SIGNIFICANT CHANGE UP
PLATELET # BLD AUTO: 224 K/UL — SIGNIFICANT CHANGE UP (ref 150–400)
POTASSIUM SERPL-MCNC: 3.5 MMOL/L — SIGNIFICANT CHANGE UP (ref 3.5–5.3)
POTASSIUM SERPL-SCNC: 3.5 MMOL/L — SIGNIFICANT CHANGE UP (ref 3.5–5.3)
PROT SERPL-MCNC: 7.7 GM/DL — SIGNIFICANT CHANGE UP (ref 6–8.3)
PROTHROM AB SERPL-ACNC: 12 SEC — SIGNIFICANT CHANGE UP (ref 10–12.9)
RBC # BLD: 3.42 M/UL — LOW (ref 3.8–5.2)
RBC # FLD: 15.5 % — HIGH (ref 10.3–14.5)
RBC BLD AUTO: ABNORMAL
SODIUM SERPL-SCNC: 142 MMOL/L — SIGNIFICANT CHANGE UP (ref 135–145)
TROPONIN I SERPL-MCNC: 0.65 NG/ML — HIGH (ref 0.01–0.04)
WBC # BLD: 4.56 K/UL — SIGNIFICANT CHANGE UP (ref 3.8–10.5)
WBC # FLD AUTO: 4.56 K/UL — SIGNIFICANT CHANGE UP (ref 3.8–10.5)

## 2020-01-01 PROCEDURE — 71045 X-RAY EXAM CHEST 1 VIEW: CPT | Mod: 26

## 2020-01-01 PROCEDURE — 99285 EMERGENCY DEPT VISIT HI MDM: CPT

## 2020-01-01 RX ORDER — FAMOTIDINE 10 MG/ML
20 INJECTION INTRAVENOUS ONCE
Refills: 0 | Status: COMPLETED | OUTPATIENT
Start: 2020-01-01 | End: 2020-01-01

## 2020-01-01 RX ADMIN — Medication 30 MILLILITER(S): at 20:08

## 2020-01-01 RX ADMIN — FAMOTIDINE 20 MILLIGRAM(S): 10 INJECTION INTRAVENOUS at 20:52

## 2020-01-01 NOTE — ED PROVIDER NOTE - CLINICAL SUMMARY MEDICAL DECISION MAKING FREE TEXT BOX
pt with CP with troponin elevation - with chest and back pain - will admit for further care with Dr. Crews

## 2020-01-01 NOTE — ED PROVIDER NOTE - CHIEF COMPLAINT
The patient is a 79y Female complaining of chest discomfort. We will call you if strep culture becomes positive.    Viral Upper Respiratory Infection    A viral upper respiratory infection (aka the common cold) can be very miserable, but will usually go away on its own within 7-10 days.  Any treatments will only help relieve symptoms, but will not cure the cold.  Antibiotics are not necessary for a common cold and will not treat the virus.  In fact, using antibiotics when not needed may cause unwanted effects such as diarrhea, yeast infection, and antibiotic drug resistance - which means the antibiotics will not work as well when they are truly needed.    Some suggestions for symptom relief:  *  Rest!    *  Saline nose drops or sprays (available over-the-counter). Place 2-3 drops in each nostril 2-4 times per day to loosen secretions and ease breathing.  You may make homemade saline drops by dissolving 1/4 tsp salt in 8 oz boiling water.  Cool to room temperature before use to avoid burns.  *  Steamy showers or inhalation of steam can also ease breathing.  *  Increase fluid intake. Warm fluids such as tea or chicken soup are very soothing.  *  May try a salt-water gargle for a sore throat (avoid in young children who may swallow the salt water).  Use the same recipe as for nose drops.  *  Honey may reduce cough and improve quality of sleep. Do NOT give honey to infants < 1 year of age due to risk of botulism.  *  Acetaminophen (Tylenol) or ibuprofen (Advil, others) may be given to reduce fever, headache, and body aches.  *  Vapor rub (such as Vicks baby rub for use in ages over 3 months or regular Vicks for use in children over 2 years of age) may ease cough and congestion  *  Hard candies or lozenges may ease cough and sore throat (for older children).  *  Cough and cold medicines are NOT recommended for children < 6 years old.  We will be happy to give suggestions if medication would be helpful for an older child.    Preventing the cold from spreading to others:  * Teach  "your child to cough into the bend of the elbow and towards the floor, not into the hand.  * Use a new tissue each time for a sneeze or to wipe the nose, and throw it away immediately.  * Wash hands (yours and your child's) after touching dirty tissues, or touching the nose, mouth, or eyes, after using the bathroom, and before eating. Wash for at least 20 seconds with warm soapy water (singing \"Happy Birthday\" or \"The ABC Song\" twice can help pass the time). Antibacterial soap is not recommended, and in fact can be harmful, leading to bacterial resistance. If soap and water is not nearby, you may use hand . Keep hand  out of the reach of children, as it is harmful if swallowed.    Please contact us:  *  if your child still has cold symptoms after 10-14 days that are not improving.  *  If your child's cold is improving, and then suddenly gets worse.  *  If your child develops new symptoms    If your child develops difficulty breathing such as wheezing, increased breathing rate, or retractions (\"sucking in\" of the skin at the base of the neck, below the sternum, or in between the ribs), contact us IMMEDIATELY or bring your child to the emergency department if unable to contact the clinic.    "

## 2020-01-01 NOTE — ED PROVIDER NOTE - OBJECTIVE STATEMENT
80 y/o F ESRD, anemia, obesity, glaucoma, DM, CKD, and pshx of cholecystectomy for evaluation of indigestion since Saturday that was not present Monday or Tuesday but came back today. Pt reports feeling gassy. Pt denies SOB. Pt took traditional therapy with backing soda and felt relief. 80 y/o F with pmhx of  ESRD, anemia, obesity, glaucoma, DM, CKD, and pshx of cholecystectomy for evaluation of indigestion since Saturday that was not present Monday or Tuesday but came back today. Pt reports feeling gassy. Pt denies SOB. Pt took traditional therapy with backing soda and felt relief. Last dialysis treatment was Tuesday.

## 2020-01-01 NOTE — ED ADULT NURSE NOTE - OBJECTIVE STATEMENT
pt c/o  indigestion on and off for a few  days denies any n/v cp SOB HD for 4 years MW friday haven't any session

## 2020-01-01 NOTE — ED ADULT TRIAGE NOTE - CHIEF COMPLAINT QUOTE
pt sent by urgent care , hx dialysis , c/o indigestion pain radiating to the back , abnormal ekg , denies sob

## 2020-01-02 DIAGNOSIS — E11.21 TYPE 2 DIABETES MELLITUS WITH DIABETIC NEPHROPATHY: ICD-10-CM

## 2020-01-02 DIAGNOSIS — R79.89 OTHER SPECIFIED ABNORMAL FINDINGS OF BLOOD CHEMISTRY: ICD-10-CM

## 2020-01-02 DIAGNOSIS — N18.6 END STAGE RENAL DISEASE: ICD-10-CM

## 2020-01-02 DIAGNOSIS — I10 ESSENTIAL (PRIMARY) HYPERTENSION: ICD-10-CM

## 2020-01-02 LAB
ANION GAP SERPL CALC-SCNC: 13 MMOL/L — SIGNIFICANT CHANGE UP (ref 5–17)
APTT BLD: 148.3 SEC — CRITICAL HIGH (ref 28.5–37)
APTT BLD: 42.1 SEC — HIGH (ref 28.5–37)
BASOPHILS # BLD AUTO: 0.03 K/UL — SIGNIFICANT CHANGE UP (ref 0–0.2)
BASOPHILS NFR BLD AUTO: 0.6 % — SIGNIFICANT CHANGE UP (ref 0–2)
BUN SERPL-MCNC: 40 MG/DL — HIGH (ref 7–23)
CALCIUM SERPL-MCNC: 10.9 MG/DL — HIGH (ref 8.5–10.1)
CHLORIDE SERPL-SCNC: 103 MMOL/L — SIGNIFICANT CHANGE UP (ref 96–108)
CO2 SERPL-SCNC: 27 MMOL/L — SIGNIFICANT CHANGE UP (ref 22–31)
CREAT SERPL-MCNC: 8.05 MG/DL — HIGH (ref 0.5–1.3)
EOSINOPHIL # BLD AUTO: 0.09 K/UL — SIGNIFICANT CHANGE UP (ref 0–0.5)
EOSINOPHIL NFR BLD AUTO: 1.9 % — SIGNIFICANT CHANGE UP (ref 0–6)
GLUCOSE BLDC GLUCOMTR-MCNC: 121 MG/DL — HIGH (ref 70–99)
GLUCOSE BLDC GLUCOMTR-MCNC: 132 MG/DL — HIGH (ref 70–99)
GLUCOSE BLDC GLUCOMTR-MCNC: 163 MG/DL — HIGH (ref 70–99)
GLUCOSE BLDC GLUCOMTR-MCNC: 173 MG/DL — HIGH (ref 70–99)
GLUCOSE SERPL-MCNC: 143 MG/DL — HIGH (ref 70–99)
HCT VFR BLD CALC: 32.9 % — LOW (ref 34.5–45)
HCT VFR BLD CALC: 34.7 % — SIGNIFICANT CHANGE UP (ref 34.5–45)
HGB BLD-MCNC: 10.2 G/DL — LOW (ref 11.5–15.5)
HGB BLD-MCNC: 10.4 G/DL — LOW (ref 11.5–15.5)
IMM GRANULOCYTES NFR BLD AUTO: 0.2 % — SIGNIFICANT CHANGE UP (ref 0–1.5)
LYMPHOCYTES # BLD AUTO: 1.21 K/UL — SIGNIFICANT CHANGE UP (ref 1–3.3)
LYMPHOCYTES # BLD AUTO: 25.4 % — SIGNIFICANT CHANGE UP (ref 13–44)
MCHC RBC-ENTMCNC: 30 GM/DL — LOW (ref 32–36)
MCHC RBC-ENTMCNC: 31 GM/DL — LOW (ref 32–36)
MCHC RBC-ENTMCNC: 31.6 PG — SIGNIFICANT CHANGE UP (ref 27–34)
MCHC RBC-ENTMCNC: 32.3 PG — SIGNIFICANT CHANGE UP (ref 27–34)
MCV RBC AUTO: 104.1 FL — HIGH (ref 80–100)
MCV RBC AUTO: 105.5 FL — HIGH (ref 80–100)
MONOCYTES # BLD AUTO: 0.43 K/UL — SIGNIFICANT CHANGE UP (ref 0–0.9)
MONOCYTES NFR BLD AUTO: 9 % — SIGNIFICANT CHANGE UP (ref 2–14)
NEUTROPHILS # BLD AUTO: 3 K/UL — SIGNIFICANT CHANGE UP (ref 1.8–7.4)
NEUTROPHILS NFR BLD AUTO: 62.9 % — SIGNIFICANT CHANGE UP (ref 43–77)
NRBC # BLD: 0 /100 WBCS — SIGNIFICANT CHANGE UP (ref 0–0)
NRBC # BLD: 0 /100 WBCS — SIGNIFICANT CHANGE UP (ref 0–0)
PLATELET # BLD AUTO: 233 K/UL — SIGNIFICANT CHANGE UP (ref 150–400)
PLATELET # BLD AUTO: 238 K/UL — SIGNIFICANT CHANGE UP (ref 150–400)
POTASSIUM SERPL-MCNC: 4 MMOL/L — SIGNIFICANT CHANGE UP (ref 3.5–5.3)
POTASSIUM SERPL-SCNC: 4 MMOL/L — SIGNIFICANT CHANGE UP (ref 3.5–5.3)
RBC # BLD: 3.16 M/UL — LOW (ref 3.8–5.2)
RBC # BLD: 3.29 M/UL — LOW (ref 3.8–5.2)
RBC # FLD: 15.4 % — HIGH (ref 10.3–14.5)
RBC # FLD: 15.5 % — HIGH (ref 10.3–14.5)
SODIUM SERPL-SCNC: 143 MMOL/L — SIGNIFICANT CHANGE UP (ref 135–145)
TROPONIN I SERPL-MCNC: 1.06 NG/ML — HIGH (ref 0.01–0.04)
TROPONIN I SERPL-MCNC: 1.06 NG/ML — HIGH (ref 0.01–0.04)
TROPONIN I SERPL-MCNC: 1.17 NG/ML — HIGH (ref 0.01–0.04)
WBC # BLD: 4.22 K/UL — SIGNIFICANT CHANGE UP (ref 3.8–10.5)
WBC # BLD: 4.77 K/UL — SIGNIFICANT CHANGE UP (ref 3.8–10.5)
WBC # FLD AUTO: 4.22 K/UL — SIGNIFICANT CHANGE UP (ref 3.8–10.5)
WBC # FLD AUTO: 4.77 K/UL — SIGNIFICANT CHANGE UP (ref 3.8–10.5)

## 2020-01-02 PROCEDURE — 99223 1ST HOSP IP/OBS HIGH 75: CPT

## 2020-01-02 PROCEDURE — 93306 TTE W/DOPPLER COMPLETE: CPT | Mod: 26

## 2020-01-02 PROCEDURE — 12345: CPT | Mod: NC

## 2020-01-02 RX ORDER — HEPARIN SODIUM 5000 [USP'U]/ML
INJECTION INTRAVENOUS; SUBCUTANEOUS
Qty: 25000 | Refills: 0 | Status: DISCONTINUED | OUTPATIENT
Start: 2020-01-02 | End: 2020-01-03

## 2020-01-02 RX ORDER — HEPARIN SODIUM 5000 [USP'U]/ML
5000 INJECTION INTRAVENOUS; SUBCUTANEOUS ONCE
Refills: 0 | Status: COMPLETED | OUTPATIENT
Start: 2020-01-02 | End: 2020-01-02

## 2020-01-02 RX ORDER — CHLORHEXIDINE GLUCONATE 213 G/1000ML
1 SOLUTION TOPICAL
Refills: 0 | Status: DISCONTINUED | OUTPATIENT
Start: 2020-01-02 | End: 2020-01-03

## 2020-01-02 RX ORDER — ATORVASTATIN CALCIUM 80 MG/1
40 TABLET, FILM COATED ORAL AT BEDTIME
Refills: 0 | Status: DISCONTINUED | OUTPATIENT
Start: 2020-01-02 | End: 2020-01-03

## 2020-01-02 RX ORDER — CARVEDILOL PHOSPHATE 80 MG/1
3.12 CAPSULE, EXTENDED RELEASE ORAL EVERY 12 HOURS
Refills: 0 | Status: DISCONTINUED | OUTPATIENT
Start: 2020-01-02 | End: 2020-01-03

## 2020-01-02 RX ORDER — REGADENOSON 0.08 MG/ML
0.4 INJECTION, SOLUTION INTRAVENOUS ONCE
Refills: 0 | Status: COMPLETED | OUTPATIENT
Start: 2020-01-02 | End: 2020-01-03

## 2020-01-02 RX ORDER — HEPARIN SODIUM 5000 [USP'U]/ML
5300 INJECTION INTRAVENOUS; SUBCUTANEOUS EVERY 6 HOURS
Refills: 0 | Status: DISCONTINUED | OUTPATIENT
Start: 2020-01-02 | End: 2020-01-03

## 2020-01-02 RX ORDER — ASPIRIN/CALCIUM CARB/MAGNESIUM 324 MG
81 TABLET ORAL DAILY
Refills: 0 | Status: DISCONTINUED | OUTPATIENT
Start: 2020-01-02 | End: 2020-01-03

## 2020-01-02 RX ORDER — DEXTROSE 50 % IN WATER 50 %
12.5 SYRINGE (ML) INTRAVENOUS ONCE
Refills: 0 | Status: DISCONTINUED | OUTPATIENT
Start: 2020-01-02 | End: 2020-01-03

## 2020-01-02 RX ORDER — DEXTROSE 50 % IN WATER 50 %
15 SYRINGE (ML) INTRAVENOUS ONCE
Refills: 0 | Status: DISCONTINUED | OUTPATIENT
Start: 2020-01-02 | End: 2020-01-03

## 2020-01-02 RX ORDER — DEXTROSE 50 % IN WATER 50 %
25 SYRINGE (ML) INTRAVENOUS ONCE
Refills: 0 | Status: DISCONTINUED | OUTPATIENT
Start: 2020-01-02 | End: 2020-01-03

## 2020-01-02 RX ORDER — PANTOPRAZOLE SODIUM 20 MG/1
40 TABLET, DELAYED RELEASE ORAL
Refills: 0 | Status: DISCONTINUED | OUTPATIENT
Start: 2020-01-02 | End: 2020-01-03

## 2020-01-02 RX ORDER — CALCIUM ACETATE 667 MG
667 TABLET ORAL
Refills: 0 | Status: DISCONTINUED | OUTPATIENT
Start: 2020-01-02 | End: 2020-01-03

## 2020-01-02 RX ORDER — INSULIN LISPRO 100/ML
VIAL (ML) SUBCUTANEOUS
Refills: 0 | Status: DISCONTINUED | OUTPATIENT
Start: 2020-01-02 | End: 2020-01-03

## 2020-01-02 RX ORDER — ENOXAPARIN SODIUM 100 MG/ML
30 INJECTION SUBCUTANEOUS DAILY
Refills: 0 | Status: DISCONTINUED | OUTPATIENT
Start: 2020-01-02 | End: 2020-01-02

## 2020-01-02 RX ORDER — INFLUENZA VIRUS VACCINE 15; 15; 15; 15 UG/.5ML; UG/.5ML; UG/.5ML; UG/.5ML
0.5 SUSPENSION INTRAMUSCULAR ONCE
Refills: 0 | Status: COMPLETED | OUTPATIENT
Start: 2020-01-02 | End: 2020-01-02

## 2020-01-02 RX ORDER — GLUCAGON INJECTION, SOLUTION 0.5 MG/.1ML
1 INJECTION, SOLUTION SUBCUTANEOUS ONCE
Refills: 0 | Status: DISCONTINUED | OUTPATIENT
Start: 2020-01-02 | End: 2020-01-03

## 2020-01-02 RX ORDER — SODIUM CHLORIDE 9 MG/ML
1000 INJECTION, SOLUTION INTRAVENOUS
Refills: 0 | Status: DISCONTINUED | OUTPATIENT
Start: 2020-01-02 | End: 2020-01-03

## 2020-01-02 RX ADMIN — Medication 667 MILLIGRAM(S): at 18:51

## 2020-01-02 RX ADMIN — Medication 667 MILLIGRAM(S): at 11:15

## 2020-01-02 RX ADMIN — HEPARIN SODIUM 1000 UNIT(S)/HR: 5000 INJECTION INTRAVENOUS; SUBCUTANEOUS at 06:00

## 2020-01-02 RX ADMIN — Medication 667 MILLIGRAM(S): at 07:41

## 2020-01-02 RX ADMIN — Medication 667 MILLIGRAM(S): at 22:42

## 2020-01-02 RX ADMIN — CARVEDILOL PHOSPHATE 3.12 MILLIGRAM(S): 80 CAPSULE, EXTENDED RELEASE ORAL at 05:39

## 2020-01-02 RX ADMIN — HEPARIN SODIUM 750 UNIT(S)/HR: 5000 INJECTION INTRAVENOUS; SUBCUTANEOUS at 13:40

## 2020-01-02 RX ADMIN — HEPARIN SODIUM 0 UNIT(S)/HR: 5000 INJECTION INTRAVENOUS; SUBCUTANEOUS at 12:27

## 2020-01-02 RX ADMIN — ATORVASTATIN CALCIUM 40 MILLIGRAM(S): 80 TABLET, FILM COATED ORAL at 22:42

## 2020-01-02 RX ADMIN — ENOXAPARIN SODIUM 30 MILLIGRAM(S): 100 INJECTION SUBCUTANEOUS at 01:40

## 2020-01-02 RX ADMIN — HEPARIN SODIUM 950 UNIT(S)/HR: 5000 INJECTION INTRAVENOUS; SUBCUTANEOUS at 22:47

## 2020-01-02 RX ADMIN — HEPARIN SODIUM 5000 UNIT(S): 5000 INJECTION INTRAVENOUS; SUBCUTANEOUS at 06:00

## 2020-01-02 RX ADMIN — Medication 1: at 11:10

## 2020-01-02 RX ADMIN — Medication 81 MILLIGRAM(S): at 11:12

## 2020-01-02 NOTE — H&P ADULT - ASSESSMENT
80 y/o F with pmhx of  ESRD, anemia, obesity, glaucoma, DM2, CKD, and pshx of cholecystectomy for evaluation of indigestion since Saturday that was not present Monday or Tuesday but came back today. Pt reports feeling gassy. Pt denies SOB. Pt took traditional therapy with backing soda and felt relief. Last dialysis treatment was Tuesday. Pt feels comfortable at present, denies 'indigestion" CP, chest heaviness or pressure, SOB, palpitations, abd pain, N/V/diarrhea. Has troponin elevation with no CP or indigestion at present. Elevated troponin but must consider ossible NSTEMI.  IMPROVE VTE Individual Risk Assessment          RISK                                                          Points    [  ] Previous VTE                                                3    [  ] Thrombophilia                                             2    [  ] Lower limb paralysis                                    2        (unable to hold up >15 seconds)      [  ] Current Cancer                                             2         (within 6 months)    [  ] Immobilization > 24 hrs                              1    [  ] ICU/CCU stay > 24 hours                            1    [ x ] Age > 60                                                    1    IMPROVE VTE Score __1_______

## 2020-01-02 NOTE — H&P ADULT - NSHPREVIEWOFSYSTEMS_GEN_ALL_CORE
REVIEW OF SYSTEMS:  CONSTITUTIONAL: No fever, weight loss, or fatigue  EYES: No eye pain, visual disturbances, or discharge  ENMT:  No difficulty hearing, tinnitus, vertigo; No sinus or throat pain  NECK: No pain or stiffness  BREASTS: No pain, masses, or nipple discharge  RESPIRATORY: No cough, wheezing, chills or hemoptysis; No shortness of breath  CARDIOVASCULAR: No chest pain, palpitations, dizziness, or leg swelling  GASTROINTESTINAL: + indigestion, No nausea, vomiting, or hematemesis; No diarrhea or constipation. No melena or hematochezia.  GENITOURINARY: No dysuria, frequency, hematuria, or incontinence  NEUROLOGICAL: No headaches, memory loss, loss of strength, numbness, or tremors  SKIN: No itching, burning, rashes, or lesions   LYMPH NODES: No enlarged glands  ENDOCRINE: No heat or cold intolerance; No hair loss  MUSCULOSKELETAL: + low back pain  PSYCHIATRIC: No depression, anxiety, mood swings, or difficulty sleeping  HEME/LYMPH: No easy bruising, or bleeding gums  ALLERG AND IMMUNOLOGIC: No hives or eczema

## 2020-01-02 NOTE — CHART NOTE - NSCHARTNOTEFT_GEN_A_CORE
Patient seen and examined  Chart reviewed  Presented with indigestion, found to have elevated troponin  Cardiology consult and Echo pending  Next HD tomorrow - Dr. Vidal contacted

## 2020-01-02 NOTE — H&P ADULT - HISTORY OF PRESENT ILLNESS
80 y/o F with pmhx of  ESRD, anemia, obesity, glaucoma, DM2, and pshx of cholecystectomy for evaluation of indigestion since Saturday that was not present Monday or Tuesday but came back today. Pt reports feeling gassy. Pt denies SOB. Pt took traditional therapy with backing soda and felt relief. Last dialysis treatment was Tuesday. Pt feels comfortable at present, denies 'indigestion" CP, chest heaviness or pressure, SOB, palpitations, abd pain, N/V/diarrhea.

## 2020-01-02 NOTE — H&P ADULT - NSICDXPASTMEDICALHX_GEN_ALL_CORE_FT
PAST MEDICAL HISTORY:  Anemia     CKD (chronic kidney disease)     DM (diabetes mellitus)     ESRD on hemodialysis Mon, Wens, Fri    Glaucoma     HTN (hypertension)     Obesity (BMI 30-39.9)

## 2020-01-02 NOTE — CONSULT NOTE ADULT - SUBJECTIVE AND OBJECTIVE BOX
CARDIOLOGY CONSULT NOTE    01-02-20 @ 09:20  CRISTY SLATER is a 79y Female with a known history of :  Essential hypertension (I10)  ESRD on hemodialysis (N18.6)  Type 2 diabetes mellitus with diabetic nephropathy, without long-term current use of insulin (E11.21)  Troponin level elevated (R79.89)    HPI:  78 y/o F with pmhx of  ESRD, anemia, obesity, glaucoma, DM2, and pshx of cholecystectomy for evaluation of indigestion since Saturday that was not present Monday or Tuesday but came back today. Pt reports feeling gassy. Pt denies SOB. Pt took traditional therapy with backing soda and felt relief. Last dialysis treatment was Tuesday. Pt feels comfortable at present, denies 'indigestion" CP, chest heaviness or pressure, SOB, palpitations, abd pain, N/V/diarrhea. (02 Jan 2020 01:12)      REVIEW OF SYSTEMS:    CONSTITUTIONAL: No fever, weight loss, or fatigue  EYES: No eye pain, visual disturbances, or discharge  ENMT:  No difficulty hearing, tinnitus, vertigo; No sinus or throat pain  NECK: No pain or stiffness  BREASTS: No pain, masses, or nipple discharge  RESPIRATORY: No cough, wheezing, chills or hemoptysis; No shortness of breath  CARDIOVASCULAR: No chest pain, palpitations, dizziness, or leg swelling  GASTROINTESTINAL: No abdominal or epigastric pain. No nausea, vomiting, or hematemesis; No diarrhea or constipation. No melena or hematochezia.  GENITOURINARY: No dysuria, frequency, hematuria, or incontinence  NEUROLOGICAL: No headaches, memory loss, loss of strength, numbness, or tremors  SKIN: No itching, burning, rashes, or lesions   LYMPH NODES: No enlarged glands  ENDOCRINE: No heat or cold intolerance; No hair loss  MUSCULOSKELETAL: No joint pain or swelling; No muscle, back, or extremity pain  PSYCHIATRIC: No depression, anxiety, mood swings, or difficulty sleeping  HEME/LYMPH: No easy bruising, or bleeding gums  ALLERGY AND IMMUNOLOGIC: No hives or eczema    MEDICATIONS  (STANDING):  aspirin enteric coated 81 milliGRAM(s) Oral daily  atorvastatin 40 milliGRAM(s) Oral at bedtime  calcium acetate 667 milliGRAM(s) Oral four times a day with meals  carvedilol 3.125 milliGRAM(s) Oral every 12 hours  chlorhexidine 4% Liquid 1 Application(s) Topical <User Schedule>  dextrose 5%. 1000 milliLiter(s) (50 mL/Hr) IV Continuous <Continuous>  dextrose 50% Injectable 12.5 Gram(s) IV Push once  dextrose 50% Injectable 25 Gram(s) IV Push once  dextrose 50% Injectable 25 Gram(s) IV Push once  heparin  Infusion.  Unit(s)/Hr (10 mL/Hr) IV Continuous <Continuous>  influenza   Vaccine 0.5 milliLiter(s) IntraMuscular once  insulin lispro (HumaLOG) corrective regimen sliding scale   SubCutaneous three times a day before meals    MEDICATIONS  (PRN):  dextrose 40% Gel 15 Gram(s) Oral once PRN Blood Glucose LESS THAN 70 milliGRAM(s)/deciliter  glucagon  Injectable 1 milliGRAM(s) IntraMuscular once PRN Glucose LESS THAN 70 milligrams/deciliter  heparin  Injectable 5300 Unit(s) IV Push every 6 hours PRN For aPTT less than 40    Alphagan: 1 drop(s) to each affected eye 2 times a day - 0.1%  amoxicillin-clavulanate 875 mg-125 mg oral tablet: 1 tab(s) orally 2 times a day  aspirin 81 mg oral delayed release tablet: 1 tab(s) orally once a day  calcium acetate 667 mg oral tablet: 2 tab(s) orally 3 times a day  dorzolamide 2% ophthalmic solution: 1 drop(s) to each affected eye once a day (at bedtime)  ferrous sulfate 325 mg (65 mg elemental iron) oral tablet: 1 tab(s) orally every 8 hours    ALLERGIES: No Known Allergies      FAMILY HISTORY: FAMILY HISTORY:  No pertinent family history in first degree relatives      PHYSICAL EXAMINATION:  -----------------------------  T(C): 36.8 (01-02-20 @ 04:55), Max: 36.8 (01-01-20 @ 23:04)  HR: 66 (01-02-20 @ 04:55) (66 - 81)  BP: 117/44 (01-02-20 @ 04:55) (105/55 - 120/52)  RR: 15 (01-02-20 @ 04:55) (15 - 20)  SpO2: 96% (01-02-20 @ 04:55) (95% - 100%)  Wt(kg): --      Weight (kg): 86.2 (01-01 @ 17:20)    Constitutional: well developed, normal appearance, well groomed, well nourished, no deformities and no acute distress.   Eyes: the conjunctiva exhibited no abnormalities and the eyelids demonstrated no xanthelasmas.   HEENT: normal oral mucosa, no oral pallor and no oral cyanosis.   Neck: normal jugular venous A waves present, normal jugular venous V waves present and no jugular venous peng A waves.   Pulmonary: no respiratory distress, normal respiratory rhythm and effort, no accessory muscle use and lungs were clear to auscultation bilaterally.   Cardiovascular: heart rate and rhythm were normal, normal S1 and S2 and no murmur, gallop, rub, heave or thrill are present.   Abdomen: soft, non-tender, no hepato-splenomegaly and no abdominal mass palpated.   Musculoskeletal: the gait could not be assessed..   Extremities: no clubbing of the fingernails, no localized cyanosis, no petechial hemorrhages and no ischemic changes.   Skin: normal skin color and pigmentation, no rash, no venous stasis, no skin lesions, no skin ulcer and no xanthoma was observed.   Psychiatric: oriented to person, place, and time, the affect was normal, the mood was normal and not feeling anxious.     LABS:   --------  01-02    143  |  103  |  40<H>  ----------------------------<  143<H>  4.0   |  27  |  8.05<H>    Ca    10.9<H>      02 Jan 2020 03:27    TPro  7.7  /  Alb  3.6  /  TBili  0.3  /  DBili  x   /  AST  14<L>  /  ALT  20  /  AlkPhos  99  01-01                         10.4   4.77  )-----------( 238      ( 02 Jan 2020 03:27 )             34.7     PT/INR - ( 01 Jan 2020 20:06 )   PT: 12.0 sec;   INR: 1.07 ratio         PTT - ( 01 Jan 2020 20:06 )  PTT:24.3 sec    01-02 @ 07:58 CPK total:--, CKMB --, Troponin I - 1.170 ng/mL<H>  01-02 @ 03:18 CPK total:--, CKMB --, Troponin I - 1.060 ng/mL<H>  01-01 @ 20:49 CPK total:--, CKMB --, Troponin I - .645 ng/mL<H>        CARDIAC MARKERS ( 02 Jan 2020 07:58 )  1.170 ng/mL / x     / x     / x     / x      CARDIAC MARKERS ( 02 Jan 2020 03:18 )  1.060 ng/mL / x     / x     / x     / x      CARDIAC MARKERS ( 01 Jan 2020 20:49 )  .645 ng/mL / x     / x     / x     / x            RADIOLOGY:  -----------------        ECG: CARDIOLOGY CONSULT NOTE    20 @ 09:20  CRISTY SLATER is a 79y Female with a known history of ESRD x 3 years, h/o anemia, obesity, glaucoma, DM2.  PShx: Cholecystectomy.  PCP: Dr. Jesus Caldwell    HPI:  c/o LBP x 1 week and feeling of lump in her throat/can't swallow intermittently. She also reports feeling gassy. Pt denies any CP, SOB. Pt took baking soda and felt some relief. Last dialysis treatment was 2 days PTA. Pt feels comfortable at present, denies any 'indigestion" CP, chest heaviness or pressure, SOB, palpitations, abd pain, N/V/diarrhea. She states she had stress test 2-3 years ago which was negative. Was sent by her Nephrologist (Dr. Vidal) to see cardiologist for routine surveillance but she never went.      REVIEW OF SYSTEMS:    CONSTITUTIONAL: No fever, weight loss, or fatigue  EYES: No eye pain, visual disturbances, or discharge  ENMT:  No difficulty hearing, tinnitus, vertigo; No sinus or throat pain  NECK: No pain or stiffness  BREASTS: No pain, masses, or nipple discharge  RESPIRATORY: No cough, wheezing, chills or hemoptysis; No shortness of breath  CARDIOVASCULAR: No chest pain, palpitations, dizziness, or leg swelling  GASTROINTESTINAL: No abdominal or epigastric pain. No nausea, vomiting, or hematemesis; No diarrhea or constipation. No melena or hematochezia.  GENITOURINARY: No dysuria, frequency, hematuria, or incontinence  NEUROLOGICAL: No headaches, memory loss, loss of strength, numbness, or tremors  SKIN: No itching, burning, rashes, or lesions   LYMPH NODES: No enlarged glands  ENDOCRINE: No heat or cold intolerance; No hair loss  MUSCULOSKELETAL: No joint pain or swelling; No muscle, back, or extremity pain  PSYCHIATRIC: No depression, anxiety, mood swings, or difficulty sleeping  HEME/LYMPH: No easy bruising, or bleeding gums  ALLERGY AND IMMUNOLOGIC: No hives or eczema    MEDICATIONS  (STANDING):  aspirin enteric coated 81 milliGRAM(s) Oral daily  atorvastatin 40 milliGRAM(s) Oral at bedtime  calcium acetate 667 milliGRAM(s) Oral four times a day with meals  carvedilol 3.125 milliGRAM(s) Oral every 12 hours  chlorhexidine 4% Liquid 1 Application(s) Topical <User Schedule>  dextrose 5%. 1000 milliLiter(s) (50 mL/Hr) IV Continuous <Continuous>  dextrose 50% Injectable 12.5 Gram(s) IV Push once  dextrose 50% Injectable 25 Gram(s) IV Push once  dextrose 50% Injectable 25 Gram(s) IV Push once  heparin  Infusion.  Unit(s)/Hr (10 mL/Hr) IV Continuous <Continuous>  influenza   Vaccine 0.5 milliLiter(s) IntraMuscular once  insulin lispro (HumaLOG) corrective regimen sliding scale   SubCutaneous three times a day before meals    MEDICATIONS  (PRN):  dextrose 40% Gel 15 Gram(s) Oral once PRN Blood Glucose LESS THAN 70 milliGRAM(s)/deciliter  glucagon  Injectable 1 milliGRAM(s) IntraMuscular once PRN Glucose LESS THAN 70 milligrams/deciliter  heparin  Injectable 5300 Unit(s) IV Push every 6 hours PRN For aPTT less than 40    Alphagan: 1 drop(s) to each affected eye 2 times a day - 0.1%  amoxicillin-clavulanate 875 mg-125 mg oral tablet: 1 tab(s) orally 2 times a day  aspirin 81 mg oral delayed release tablet: 1 tab(s) orally once a day  calcium acetate 667 mg oral tablet: 2 tab(s) orally 3 times a day  dorzolamide 2% ophthalmic solution: 1 drop(s) to each affected eye once a day (at bedtime)  ferrous sulfate 325 mg (65 mg elemental iron) oral tablet: 1 tab(s) orally every 8 hours    ALLERGIES: No Known Allergies      FAMILY HISTORY: FAMILY HISTORY:  No pertinent family history in first degree relatives      PHYSICAL EXAMINATION:  -----------------------------  T(C): 36.8 (20 @ 04:55), Max: 36.8 (20 @ 23:04)  HR: 66 (20 @ 04:55) (66 - 81)  BP: 117/44 (20 @ 04:55) (105/55 - 120/52)  RR: 15 (20 @ 04:55) (15 - 20)  SpO2: 96% (20 @ 04:55) (95% - 100%)  Wt(kg): --      Weight (kg): 86.2 ( @ 17:20)    Constitutional: well developed, normal appearance, well groomed, well nourished, no deformities and no acute distress.   Eyes: the conjunctiva exhibited no abnormalities and the eyelids demonstrated no xanthelasmas.   HEENT: normal oral mucosa, no oral pallor and no oral cyanosis.   Neck: normal jugular venous A waves present, normal jugular venous V waves present and no jugular venous peng A waves.   Pulmonary: no respiratory distress, normal respiratory rhythm and effort, no accessory muscle use and lungs were clear to auscultation bilaterally.   Cardiovascular: heart rate and rhythm were normal, normal S1 and S2 and no murmur, gallop, rub, heave or thrill are present.   Abdomen: soft, non-tender, no hepato-splenomegaly and no abdominal mass palpated.   Musculoskeletal: the gait could not be assessed..   Extremities: no clubbing of the fingernails, no localized cyanosis, no petechial hemorrhages and no ischemic changes.   Skin: normal skin color and pigmentation, no rash, no venous stasis, no skin lesions, no skin ulcer and no xanthoma was observed.   Psychiatric: oriented to person, place, and time, the affect was normal, the mood was normal and not feeling anxious.     LABS:   --------      143  |  103  |  40<H>  ----------------------------<  143<H>  4.0   |  27  |  8.05<H>    Ca    10.9<H>      2020 03:27    TPro  7.7  /  Alb  3.6  /  TBili  0.3  /  DBili  x   /  AST  14<L>  /  ALT  20  /  AlkPhos  99                           10.4   4.77  )-----------( 238      ( 2020 03:27 )             34.7     PT/INR - ( 2020 20:06 )   PT: 12.0 sec;   INR: 1.07 ratio         PTT - ( 2020 20:06 )  PTT:24.3 sec     @ 07:58 CPK total:--, CKMB --, Troponin I - 1.170 ng/mL<H>   @ 03:18 CPK total:--, CKMB --, Troponin I - 1.060 ng/mL<H>   @ 20:49 CPK total:--, CKMB --, Troponin I - .645 ng/mL<H>      RADIOLOGY:  -----------------    < from: TTE Echo Doppler w/o Cont (17 @ 14:16) >     EXAM:  TTE WO CON COMPLETE W DOPPL         PROCEDURE DATE:  2017        INTERPRETATION:  REPORT:    TRANSTHORACIC ECHOCARDIOGRAM REPORT           Patient Name:   CRISTY SLATER Patient Location: [facility] Rec #:  [id number]     Accession #:     [id number]  Account #:                     Height:           59.8 in 152.0 cm  YOB: 1940       Weight:           170.6 lb 77.40 kg  Patient Age:    76 years       BSA:              1.74 m²  Patient Gender: F              BP:      126/54 mmHg        Date of Exam: 2017 11:50:22 AM  Sonographer:  EVELINA     Procedure:   2D Echo/Doppler/Color Doppler Complete.  Indications: Syncope and collapse - R55  Diagnosis:   Syncope and collapse - R55           2D AND M-MODE MEASUREMENTS (normal ranges within parentheses):  Left Ventricle:                  Normal   Aorta/Left Atrium:            Normal  IVSd (2D):              0.91 cm (0.7-1.1) Aortic Root (2D):  2.74 cm   (2.4-3.7)  LVPWd (2D):             0.96 cm (0.7-1.1) Left Atrium (2D):  3.78 cm   (1.9-4.0)  LVIDd (2D):             3.92 cm (3.4-5.7) Right Ventricle:  LVIDs (2D):             3.09 cm           TAPSE:           2.22 cm  LV FS (2D):             21.1 %   (>25%)  Relative Wall Thickness  0.49    (<0.42)     LV DIASTOLIC FUNCTION:  MV Peak E: 1.01 m/s Decel Time: 402 msec  MV Peak A: 1.38 m/s  E/A Ratio: 0.73     SPECTRAL DOPPLER ANALYSIS (where applicable):  Mitral Valve:  MV Max Jay:   1.41 m/s MV P1/2 Time: 116.64 msec  MV Mean Grad: 2.5 mmHg MV Area, PHT: 1.89 cm²     Aortic Valve: AoV Max Jay: 2.89 m/s AoV Peak P.4 mmHg AoV Mean P.6 mmHg     LVOT Vmax: 1.41 m/s LVOT VTI: 0.332 m LVOT Diameter: 2.00 cm     AoV Area, Vmax: 1.53 cm² AoV Area, VTI: 1.71 cm² AoV Area, Vmn: 1.47 cm²     Tricuspid Valve and PA/RV Systolic Pressure: TR Max Velocity: 2.73 m/s   RA Pressure: 5 mmHg RVSP/PASP: 34.8 mmHg        PHYSICIAN INTERPRETATION:  Left Ventricle: Normal left ventricular size and wall thicknesses, with   normal systolic and diastolic function.  Left ventricular ejection fraction, by visual estimation, is 60 to 65%.   Spectral Doppler shows impaired relaxation pattern of left ventricular   myocardial filling (Grade I diastolic dysfunction).  Right Ventricle: Normal right ventricular size and function.  Left Atrium: The left atrium is normal in size. Normal left atrial size.  Right Atrium: The right atrium is normal in size.  Pericardium: There is no evidence of pericardial effusion.  Mitral Valve: Structurally normal mitral valve, with normal leaflet   excursion. Thickening of the anterior and posterior mitral valve   leaflets. There is mild mitral annular calcification. Peak transmitral   mean gradient equals 2.5 mmHg, calculated mitral valve area by pressure   half time equals1.89 cm² consistent with mild mitral stenosis. Mitral   valve mean gradient is 2.5 mmHg consistent with mild mitral stenosis.  Tricuspid Valve: Structurally normal tricuspid valve, with normal leaflet   excursion. The tricuspid valve is normal in structure. Mild tricuspid   regurgitation is visualized.  Aortic Valve: Normal trileaflet aortic valve with normal opening. Peak   transaortic gradient equals 33.4 mmHg, mean transaortic gradient equals   17.6 mmHg, the calculated aortic valve area equals 1.71 cm² by the   continuity equation consistent with mild aortic stenosis.  Pulmonic Valve: Structurally normal pulmonic valve, with normal leaflet   excursion. Trace pulmonic valve regurgitation.  Aorta: The aortic root and ascending aorta are structurally normal, with   no evidence of dilitation.  Pulmonary Artery: The main pulmonary artery is normal in size.        Summary:   1. Left ventricular ejection fraction, by visual estimation, is 60 to   65%.   2. Spectral Doppler shows impaired relaxation pattern of left   ventricular myocardial filling (Grade I diastolic dysfunction).   3. Mild mitral annular calcification.   4. Peak transmitral mean gradient equals 2.5 mmHg, calculated mitral   valve area by pressure half time equals 1.89 cm²consistent with mild   mitral stenosis.   5. Thickening of the anterior and posterior mitral valve leaflets.   6. Mitral valve mean gradient is 2.5 mmHg consistent with mild mitral   stenosis.   7. Peak transaortic gradient equals 33.4 mmHg, mean transaortic gradient   equals 17.6 mmHg, the calculated aortic valve tricuspid and trace   pulmonic insufficiency area equalMilds 1.71 cm² by the continuity   equation consistent with mild aortic stenosis.   8. Mild tricuspid and trace pulmonic insufficiency.     [id number] Jesus Green MD, FACC  Electronically signed on 2017 at 9:40:11 PM       *** Final ***    JESUS GREEN M.D.; Attending Cardiologist  This document has been electronically signed. 2017 11:50AM        ECG: NSR, CRRBB, LAHB, no old ecg for comp

## 2020-01-02 NOTE — CONSULT NOTE ADULT - ASSESSMENT
full note to follow 78 yo female admitted for LBP and "lump in throat". No exertional symptoms noted, does have prior h/o GERD, seen by Dr. Caldwell.  Bifascicular block on ECG, no old ecg for comparison.  Elevated troponins with normal CPK's in setting of ESRD, not pathognomonic for ACS.  Risk stratification with TTE and pharm stress test in AM.  On bbl, statin, ASA. Continue IV heparin for now.  Suggest PPi and GI evaluation.

## 2020-01-02 NOTE — CONSULT NOTE ADULT - ASSESSMENT
79 female with a history of DM. PVD, HTN, CAD, CHF, ESRD on HD with anemia now admitted with a history of chest pain and symptoms of GERD.   Found to have elevated troponins and is being ruled out for ACS. Will arrange for dialysis today and stress test in AM.   Spoke to family at bed side. Will follow.

## 2020-01-02 NOTE — H&P ADULT - NSHPLABSRESULTS_GEN_ALL_CORE
T(C): 36.8 (01 Jan 2020 23:04), Max: 36.8 (01 Jan 2020 23:04)  T(F): 98.3 (01 Jan 2020 23:04), Max: 98.3 (01 Jan 2020 23:04)  HR: 81 (01 Jan 2020 23:04) (80 - 81)  BP: 105/55 (01 Jan 2020 23:04) (105/55 - 120/52)  BP(mean): --  RR: 15 (01 Jan 2020 23:04) (15 - 20)  SpO2: 95% (01 Jan 2020 23:04) (95% - 100%)I&O's Summary      LABS:                        10.8   4.56  )-----------( 224      ( 01 Jan 2020 20:06 )             36.3     01-01    142  |  100  |  32<H>  ----------------------------<  161<H>  3.5   |  27  |  7.37<H>    Ca    9.9      01 Jan 2020 20:49    TPro  7.7  /  Alb  3.6  /  TBili  0.3  /  DBili  x   /  AST  14<L>  /  ALT  20  /  AlkPhos  99  01-01    PT/INR - ( 01 Jan 2020 20:06 )   PT: 12.0 sec;   INR: 1.07 ratio         PTT - ( 01 Jan 2020 20:06 )  PTT:24.3 sec    CAPILLARY BLOOD GLUCOSE  Troponin I, Serum: .645:  ng/mL (01.01.20 @ 20:49)          RADIOLOGY & ADDITIONAL TESTS:  CXR: no infiltrate or PVC  Imaging Personally Reviewed:  [x ] YES  [ ] NO  sinus@ 78 LAHB, RBBB, LVH, no acute ischemic changes

## 2020-01-02 NOTE — CONSULT NOTE ADULT - SUBJECTIVE AND OBJECTIVE BOX
Nephrology Consultation: MD BRYON Solis CRISTY  79y    HPI:  78 y/o F with pmhx of  ESRD, anemia, obesity, glaucoma, DM2, and pshx of cholecystectomy for evaluation of indigestion since Saturday that was not present Monday or Tuesday but came back today. Pt reports feeling gassy. Pt denies SOB. Pt took traditional therapy with backing soda and felt relief. Last dialysis treatment was Tuesday. Pt feels comfortable at present, denies 'indigestion" CP, chest heaviness or pressure, SOB, palpitations, abd pain, N/V/diarrhea. Spoke to Dr. Curry, the plan is to do a sttress test on her tomorrow. Last dialysis was on tuesday.       PAST MEDICAL & SURGICAL HISTORY:  ESRD on hemodialysis: Mon, Wens, Fri  Anemia  Obesity (BMI 30-39.9)  Glaucoma  DM (diabetes mellitus)  CKD (chronic kidney disease)  HTN (hypertension)  History of cholecystectomy      Allergies    No Known Allergies    Intolerances        Home Medications:  Alphagan: 1 drop(s) to each affected eye 2 times a day - 0.1% (04 Mar 2016 09:15)  aspirin 81 mg oral delayed release tablet: 1 tab(s) orally once a day (03 Feb 2017 12:45)  dorzolamide 2% ophthalmic solution: 1 drop(s) to each affected eye once a day (at bedtime) (03 Feb 2017 12:45)  ferrous sulfate 325 mg (65 mg elemental iron) oral tablet: 1 tab(s) orally every 8 hours (04 Mar 2016 02:24)        FAMILY HISTORY:  No pertinent family history in first degree relatives      SOCIAL HISTORY:    REVIEW OF SYSTEMS:    Constitutional: No fever, weight loss or fatigue  Eyes: No eye pain, visual disturbances, or discharge  ENT:  No difficulty hearing, tinnitus, vertigo; No sinus or throat pain  Neck: No pain or stiffness  Breasts: No pain, masses or nipple discharge  Respiratory: No cough, wheezing, chills or hemoptysis  Cardiovascular: No chest pain, palpitations, shortness of breath, dizziness or leg swelling  Gastrointestinal: No abdominal or epigastric pain. No nausea, vomiting or hematemesis; No diarrhea or constipation. No melena or hematochezia.  Genitourinary: No dysuria, frequency, hematuria or incontinence  Rectal: No pain, hemorrhoids or incontinence  Neurological: No headaches, memory loss, loss of strength, numbness or tremors  Skin: No itching, burning, rashes or lesions   Lymph Nodes: No enlarged glands  Endocrine: No heat or cold intolerance; No hair loss  Musculoskeletal: No joint pain or swelling; No muscle, back or extremity pain  Psychiatric: No depression, anxiety, mood swings or difficulty sleeping  Heme/Lymph: No easy bruising or bleeding gums  Allergy and Immunologic: No hives or eczema    current medications:    aspirin enteric coated 81 milliGRAM(s) Oral daily  atorvastatin 40 milliGRAM(s) Oral at bedtime  calcium acetate 667 milliGRAM(s) Oral four times a day with meals  carvedilol 3.125 milliGRAM(s) Oral every 12 hours  chlorhexidine 4% Liquid 1 Application(s) Topical <User Schedule>  dextrose 40% Gel 15 Gram(s) Oral once PRN  dextrose 5%. 1000 milliLiter(s) IV Continuous <Continuous>  dextrose 50% Injectable 12.5 Gram(s) IV Push once  dextrose 50% Injectable 25 Gram(s) IV Push once  dextrose 50% Injectable 25 Gram(s) IV Push once  glucagon  Injectable 1 milliGRAM(s) IntraMuscular once PRN  heparin  Infusion.  Unit(s)/Hr IV Continuous <Continuous>  heparin  Injectable 5300 Unit(s) IV Push every 6 hours PRN  influenza   Vaccine 0.5 milliLiter(s) IntraMuscular once  insulin lispro (HumaLOG) corrective regimen sliding scale   SubCutaneous three times a day before meals  regadenoson Injectable 0.4 milliGRAM(s) IV Push once      Vital Signs Last 24 Hrs  T(C): 36.1 (02 Jan 2020 13:01), Max: 36.8 (01 Jan 2020 23:04)  T(F): 96.9 (02 Jan 2020 13:01), Max: 98.3 (01 Jan 2020 23:04)  HR: 69 (02 Jan 2020 13:01) (66 - 81)  BP: 115/44 (02 Jan 2020 13:01) (105/39 - 120/52)  BP(mean): --  RR: 18 (02 Jan 2020 13:01) (15 - 20)  SpO2: 98% (02 Jan 2020 13:01) (95% - 100%)    PHYSICAL EXAM:    Constitutional: NAD, well-groomed, well-developed  HEENT: PERRLA, EOMI, Normal Hearing, MMM  Neck: No LAD, No JVD  Back: Normal spine flexure, No CVA tenderness  Respiratory: CTAB/L   Cardiovascular: S1 and S2, RRR, no M/G/R  Gastrointestinal: BS+, soft, NT/ND  Extremities: No peripheral edema  Vascular: 2+ peripheral pulses  Neurological: A/O x 3, no focal deficits  Skin: No rashes      LABS:                        10.4   4.77  )-----------( 238      ( 02 Jan 2020 03:27 )             34.7     01-02    143  |  103  |  40<H>  ----------------------------<  143<H>  4.0   |  27  |  8.05<H>    Ca    10.9<H>      02 Jan 2020 03:27    TPro  7.7  /  Alb  3.6  /  TBili  0.3  /  DBili  x   /  AST  14<L>  /  ALT  20  /  AlkPhos  99  01-01    PT/INR - ( 01 Jan 2020 20:06 )   PT: 12.0 sec;   INR: 1.07 ratio         PTT - ( 02 Jan 2020 11:39 )  PTT:148.3 sec    Creatinine Trend: 8.05<--, 7.37<--    MICROBIOLOGY:  RECENT CULTURES:        RADIOLOGY & ADDITIONAL STUDIES:    < from: Xray Chest 1 View- PORTABLE-Urgent (01.01.20 @ 20:11) >  EXAM:  XR CHEST PORTABLE URGENT 1V                            PROCEDURE DATE:  01/01/2020          INTERPRETATION:    DATE OF STUDY: 1/1/2020    PRIOR:2/1/17.    CLINICAL INDICATION: Chest pain.    TECHNIQUE: portable chest - done upright.    FINDINGS:   Thoracic aortic atheromatous changes and ectasia are stable.  The cardiomediastinal silhouette is otherwise within normal limits.  Pulmonary vascularity is normal.  No focal consolidations. No pleural effusion or pneumothorax.  The bony structures are intact.  No free subdiaphragmatic air.    IMPRESSION:   Stable chest.  Negative for active disease.

## 2020-01-02 NOTE — H&P ADULT - NSHPPHYSICALEXAM_GEN_ALL_CORE
T(C): 36.8 (01 Jan 2020 23:04), Max: 36.8 (01 Jan 2020 23:04)  T(F): 98.3 (01 Jan 2020 23:04), Max: 98.3 (01 Jan 2020 23:04)  HR: 81 (01 Jan 2020 23:04) (80 - 81)  BP: 105/55 (01 Jan 2020 23:04) (105/55 - 120/52)  BP(mean): --  RR: 15 (01 Jan 2020 23:04) (15 - 20)  SpO2: 95% (01 Jan 2020 23:04) (95% - 100%)    PHYSICAL EXAM:  GENERAL: NAD, well-groomed, well-developed  HEAD:  Atraumatic, Normocephalic  EYES: EOMI, PERRLA, conjunctiva and sclera clear  ENMT: No tonsillar erythema, exudates, or enlargement; Moist mucous membranes, No lesions  NECK: Supple, No JVD, Normal thyroid  NERVOUS SYSTEM:  Alert & Oriented X3, CN 2-12 intact, no focal deficits  CHEST/LUNG: Clear to percussion bilaterally; No rales, rhonchi, wheezing, or rubs  HEART: Regular rate and rhythm; No murmurs, rubs, or gallops  ABDOMEN: Soft, Nontender, Nondistended; Bowel sounds present  EXTREMITIES:  + Peripheral Pulses, No clubbing, cyanosis, or edema  LYMPH: No lymphadenopathy noted  SKIN: No rashes or lesions

## 2020-01-02 NOTE — H&P ADULT - PROBLEM SELECTOR PLAN 1
monitor on telemetry, serial cardiac enzymes, ASA, anticoagulation, statin  echo, cardiology consult

## 2020-01-03 ENCOUNTER — TRANSCRIPTION ENCOUNTER (OUTPATIENT)
Age: 80
End: 2020-01-03

## 2020-01-03 VITALS
SYSTOLIC BLOOD PRESSURE: 123 MMHG | HEART RATE: 77 BPM | RESPIRATION RATE: 16 BRPM | TEMPERATURE: 98 F | OXYGEN SATURATION: 99 % | DIASTOLIC BLOOD PRESSURE: 54 MMHG

## 2020-01-03 LAB
ANION GAP SERPL CALC-SCNC: 11 MMOL/L — SIGNIFICANT CHANGE UP (ref 5–17)
APTT BLD: 56.7 SEC — HIGH (ref 27.5–36.3)
APTT BLD: 63.7 SEC — HIGH (ref 27.5–36.3)
BUN SERPL-MCNC: 33 MG/DL — HIGH (ref 7–23)
CALCIUM SERPL-MCNC: 9.9 MG/DL — SIGNIFICANT CHANGE UP (ref 8.5–10.1)
CHLORIDE SERPL-SCNC: 103 MMOL/L — SIGNIFICANT CHANGE UP (ref 96–108)
CO2 SERPL-SCNC: 30 MMOL/L — SIGNIFICANT CHANGE UP (ref 22–31)
CREAT SERPL-MCNC: 6.01 MG/DL — HIGH (ref 0.5–1.3)
GLUCOSE BLDC GLUCOMTR-MCNC: 105 MG/DL — HIGH (ref 70–99)
GLUCOSE BLDC GLUCOMTR-MCNC: 128 MG/DL — HIGH (ref 70–99)
GLUCOSE BLDC GLUCOMTR-MCNC: 167 MG/DL — HIGH (ref 70–99)
GLUCOSE SERPL-MCNC: 122 MG/DL — HIGH (ref 70–99)
HBA1C BLD-MCNC: 6.4 % — HIGH (ref 4–5.6)
HCT VFR BLD CALC: 31.3 % — LOW (ref 34.5–45)
HGB BLD-MCNC: 9.7 G/DL — LOW (ref 11.5–15.5)
MCHC RBC-ENTMCNC: 31 GM/DL — LOW (ref 32–36)
MCHC RBC-ENTMCNC: 31.9 PG — SIGNIFICANT CHANGE UP (ref 27–34)
MCV RBC AUTO: 103 FL — HIGH (ref 80–100)
NRBC # BLD: 0 /100 WBCS — SIGNIFICANT CHANGE UP (ref 0–0)
PLATELET # BLD AUTO: 223 K/UL — SIGNIFICANT CHANGE UP (ref 150–400)
POTASSIUM SERPL-MCNC: 3.6 MMOL/L — SIGNIFICANT CHANGE UP (ref 3.5–5.3)
POTASSIUM SERPL-SCNC: 3.6 MMOL/L — SIGNIFICANT CHANGE UP (ref 3.5–5.3)
RBC # BLD: 3.04 M/UL — LOW (ref 3.8–5.2)
RBC # FLD: 15.7 % — HIGH (ref 10.3–14.5)
SODIUM SERPL-SCNC: 144 MMOL/L — SIGNIFICANT CHANGE UP (ref 135–145)
TROPONIN I SERPL-MCNC: 0.49 NG/ML — HIGH (ref 0.01–0.04)
WBC # BLD: 3.89 K/UL — SIGNIFICANT CHANGE UP (ref 3.8–10.5)
WBC # FLD AUTO: 3.89 K/UL — SIGNIFICANT CHANGE UP (ref 3.8–10.5)

## 2020-01-03 PROCEDURE — 78452 HT MUSCLE IMAGE SPECT MULT: CPT | Mod: 26

## 2020-01-03 PROCEDURE — 99238 HOSP IP/OBS DSCHRG MGMT 30/<: CPT

## 2020-01-03 RX ORDER — ATORVASTATIN CALCIUM 80 MG/1
1 TABLET, FILM COATED ORAL
Qty: 30 | Refills: 0
Start: 2020-01-03

## 2020-01-03 RX ORDER — CARVEDILOL PHOSPHATE 80 MG/1
1 CAPSULE, EXTENDED RELEASE ORAL
Qty: 60 | Refills: 0
Start: 2020-01-03

## 2020-01-03 RX ORDER — PANTOPRAZOLE SODIUM 20 MG/1
1 TABLET, DELAYED RELEASE ORAL
Qty: 30 | Refills: 0
Start: 2020-01-03

## 2020-01-03 RX ADMIN — REGADENOSON 0.4 MILLIGRAM(S): 0.08 INJECTION, SOLUTION INTRAVENOUS at 11:07

## 2020-01-03 RX ADMIN — Medication 81 MILLIGRAM(S): at 12:26

## 2020-01-03 RX ADMIN — Medication 667 MILLIGRAM(S): at 17:31

## 2020-01-03 RX ADMIN — Medication 667 MILLIGRAM(S): at 12:26

## 2020-01-03 RX ADMIN — PANTOPRAZOLE SODIUM 40 MILLIGRAM(S): 20 TABLET, DELAYED RELEASE ORAL at 05:45

## 2020-01-03 RX ADMIN — CHLORHEXIDINE GLUCONATE 1 APPLICATION(S): 213 SOLUTION TOPICAL at 05:44

## 2020-01-03 RX ADMIN — HEPARIN SODIUM 950 UNIT(S)/HR: 5000 INJECTION INTRAVENOUS; SUBCUTANEOUS at 05:49

## 2020-01-03 NOTE — DISCHARGE NOTE NURSING/CASE MANAGEMENT/SOCIAL WORK - PATIENT PORTAL LINK FT
You can access the FollowMyHealth Patient Portal offered by [facility] by registering at the following website: [email or web address]. By joining [facility]’s FollowMyHealth portal, you will also be able to view your health information using other applications (apps) compatible with our system.

## 2020-01-03 NOTE — DISCHARGE NOTE PROVIDER - NSDCCPCAREPLAN_GEN_ALL_CORE_FT
PRINCIPAL DISCHARGE DIAGNOSIS  Diagnosis: Indigestion  Assessment and Plan of Treatment: started on PPI  Advise Gastroenterology consult      SECONDARY DISCHARGE DIAGNOSES  Diagnosis: Elevated troponin level  Assessment and Plan of Treatment: likely due to ESRD  Follow up with Cardiologist

## 2020-01-03 NOTE — DISCHARGE NOTE PROVIDER - NSDCMRMEDTOKEN_GEN_ALL_CORE_FT
Alphagan: 1 drop(s) to each affected eye 2 times a day - 0.1%  aspirin 81 mg oral delayed release tablet: 1 tab(s) orally once a day  atorvastatin 40 mg oral tablet: 1 tab(s) orally once a day (at bedtime)  calcium acetate 667 mg oral tablet: 2 tab(s) orally 3 times a day  carvedilol 3.125 mg oral tablet: 1 tab(s) orally every 12 hours  dorzolamide 2% ophthalmic solution: 1 drop(s) to each affected eye once a day (at bedtime)  ferrous sulfate 325 mg (65 mg elemental iron) oral tablet: 1 tab(s) orally every 8 hours  pantoprazole 40 mg oral delayed release tablet: 1 tab(s) orally once a day (before a meal)

## 2020-01-03 NOTE — DISCHARGE NOTE PROVIDER - CARE PROVIDER_API CALL
Balbir Caldwell)  Internal Medicine  [address] [facility] [address]  [place] [address]  Phone: [phone]  Fax: [phone]  Established Patient  Follow Up Time: 1 week    Jesus Green)  Cardiology; Cardiovascular Disease; Nuclear Cardiology  [address] [place] [address]  Phone: [phone]  Fax: [phone]  Established Patient  Follow Up Time: 1 week    Reinaldo Vidal)  Internal Medicine; Nephrology  [address] [facility], [address] [place] [id number]  Phone: [phone]  Fax: [phone]  Established Patient  Follow Up Time: 1-3 days

## 2020-01-03 NOTE — DISCHARGE NOTE PROVIDER - HOSPITAL COURSE
80 yo F with complaint of indigestion and feeling a lump in her throat presented to the ED and found to have elevated troponin levels.    Patient was admitted and seen by Nephrology and Cardiology    Stress test was negative and troponin trended down    Cleared by Cardiology for discharge    Recommend PPI and GI follow-up as outpatient.        Dx:  Elevated troponin unlikely NSTEMI, more likely due to ESRD    ESRD on dialysis    Possible GERD        Vital Signs Last 24 Hrs    T(C): 36.6 (03 Jan 2020 12:47), Max: 37.1 (02 Jan 2020 17:45)    T(F): 97.9 (03 Jan 2020 12:47), Max: 98.7 (02 Jan 2020 17:45)    HR: 77 (03 Jan 2020 12:47) (64 - 82)    BP: 123/54 (03 Jan 2020 12:47) (93/34 - 123/54)    BP(mean): --    RR: 16 (03 Jan 2020 12:47) (16 - 179)    SpO2: 99% (03 Jan 2020 12:47) (96% - 99%)    Constitutional: well developed, normal appearance, well groomed, well nourished, no deformities and no acute distress.     Eyes: the conjunctiva exhibited no abnormalities and the eyelids demonstrated no xanthelasmas.     HEENT: normal oral mucosa, no oral pallor and no oral cyanosis.     Pulmonary: no respiratory distress, normal respiratory rhythm and effort, no accessory muscle use and lungs were clear to auscultation bilaterally.     Cardiovascular: heart rate and rhythm were normal, normal S1 and S2 and no murmur, gallop, rub, heave or thrill are present.     Abdomen: soft, non-tender, no hepato-splenomegaly and no abdominal mass palpated.     Musculoskeletal: the gait could not be assessed..     Extremities: no clubbing of the fingernails, no localized cyanosis, no petechial hemorrhages and no ischemic changes.     Skin: normal skin color and pigmentation, no rash, no venous stasis, no skin lesions, no skin ulcer and no xanthoma was observed.     Psychiatric: oriented to person, place, and time, the affect was normal, the mood was normal and not feeling anxious.         Labs                                9.7      3.89  )-----------( 223      ( 03 Jan 2020 05:04 )               31.3         01-03        144  |  103  |  33<H>    ----------------------------<  122<H>    3.6   |  30  |  6.01<H>        Ca    9.9      03 Jan 2020 05:04        TPro  7.7  /  Alb  3.6  /  TBili  0.3  /  DBili  x   /  AST  14<L>  /  ALT  20  /  AlkPhos  99  01-01        PT/INR - ( 01 Jan 2020 20:06 )   PT: 12.0 sec;   INR: 1.07 ratio      PTT - ( 03 Jan 2020 12:43 )  PTT:56.7 sec        CARDIAC MARKERS ( 03 Jan 2020 05:04 )    .489 ng/mL / x     / x     / x     / x        CARDIAC MARKERS ( 02 Jan 2020 11:26 )    1.060 ng/mL / x     / x     / x     / x        CARDIAC MARKERS ( 02 Jan 2020 07:58 )    1.170 ng/mL / x     / x     / x     / x        CARDIAC MARKERS ( 02 Jan 2020 03:18 )    1.060 ng/mL / x     / x     / x     / x        CARDIAC MARKERS ( 01 Jan 2020 20:49 )    .645 ng/mL / x     / x     / x     / x            < from: NM Pharm Stress Test, Dual Isotope (01.03.20 @ 12:13) >        IMPRESSION: Normal myocardial perfusion scan.        1. No definitive scintigraphic evidence for myocardial infarct or ischemia, inferior wall segments were not well visualized due to diaphragmatic         2. There is normal left ventricular contractility, normalcalculated ejection fraction and normal myocardial thickening at rest. Overall post stress ejection fraction was 86%         Impression: No new EKG changes        < from: Xray Chest 1 View- PORTABLE-Urgent (01.01.20 @ 20:11) >        IMPRESSION:     Stable chest.    Negative for active disease.         < from: TTE Echo Doppler w/o Cont (01.02.20 @ 19:24) >        Summary:     1. Mitral valve mean gradient is 4.1 mmHg consistent with mild mitral stenosis.     2. Peak transaortic gradient equals 50.1 mmHg, mean transaortic gradient equals 24.0 mmHg, the calculated aortic valve area equals 1.74 cm² by the continuity equation consistent with mild aortic stenosis.     3. Mild tricuspid and trace pulmonic insufficiency.     4. Spectral Doppler shows impaired relaxation pattern of left ventricular myocardial filling (Grade I diastolic dysfunction).     5. Left ventricular ejection fraction, by visual estimation, is 60 to 65%.     6. Mild thickening of the anterior and posterior mitral valve leaflets.     7. Mild mitral annular calcification.     8. Mild mitral valve regurgitation.     9. Normal global left ventricular systolic function.

## 2020-01-03 NOTE — DISCHARGE NOTE PROVIDER - PROVIDER TOKENS
PROVIDER:[TOKEN:[[id number]],FOLLOWUP:[1 week],ESTABLISHEDPATIENT:[T]],PROVIDER:[TOKEN:[[id number]],FOLLOWUP:[1 week],ESTABLISHEDPATIENT:[T]],PROVIDER:[TOKEN:[[id number]],FOLLOWUP:[1-3 days],ESTABLISHEDPATIENT:[T]]

## 2020-01-03 NOTE — DISCHARGE NOTE PROVIDER - CARE PROVIDERS DIRECT ADDRESSES
,DirectAddress_Unknown,[email or web address]

## 2020-01-06 ENCOUNTER — INBOUND DOCUMENT (OUTPATIENT)
Age: 80
End: 2020-01-06

## 2020-01-08 DIAGNOSIS — K21.9 GASTRO-ESOPHAGEAL REFLUX DISEASE WITHOUT ESOPHAGITIS: ICD-10-CM

## 2020-01-08 DIAGNOSIS — N18.6 END STAGE RENAL DISEASE: ICD-10-CM

## 2020-01-08 DIAGNOSIS — Z90.49 ACQUIRED ABSENCE OF OTHER SPECIFIED PARTS OF DIGESTIVE TRACT: ICD-10-CM

## 2020-01-08 DIAGNOSIS — Z79.2 LONG TERM (CURRENT) USE OF ANTIBIOTICS: ICD-10-CM

## 2020-01-08 DIAGNOSIS — D63.1 ANEMIA IN CHRONIC KIDNEY DISEASE: ICD-10-CM

## 2020-01-08 DIAGNOSIS — Z79.82 LONG TERM (CURRENT) USE OF ASPIRIN: ICD-10-CM

## 2020-01-08 DIAGNOSIS — H40.9 UNSPECIFIED GLAUCOMA: ICD-10-CM

## 2020-01-08 DIAGNOSIS — Z99.2 DEPENDENCE ON RENAL DIALYSIS: ICD-10-CM

## 2020-01-08 DIAGNOSIS — R79.89 OTHER SPECIFIED ABNORMAL FINDINGS OF BLOOD CHEMISTRY: ICD-10-CM

## 2020-01-08 DIAGNOSIS — Z79.899 OTHER LONG TERM (CURRENT) DRUG THERAPY: ICD-10-CM

## 2020-01-08 DIAGNOSIS — E66.9 OBESITY, UNSPECIFIED: ICD-10-CM

## 2020-01-08 DIAGNOSIS — I12.0 HYPERTENSIVE CHRONIC KIDNEY DISEASE WITH STAGE 5 CHRONIC KIDNEY DISEASE OR END STAGE RENAL DISEASE: ICD-10-CM

## 2020-01-08 DIAGNOSIS — E11.22 TYPE 2 DIABETES MELLITUS WITH DIABETIC CHRONIC KIDNEY DISEASE: ICD-10-CM

## 2020-01-09 NOTE — ED PROVIDER NOTE - SKIN, MLM
self-care/community/leisure/home management
Skin normal color for race, warm, dry and intact. No evidence of rash.

## 2021-06-08 NOTE — PATIENT PROFILE ADULT - FUNCTIONAL SCREEN CURRENT LEVEL: SWALLOWING (IF SCORE 2 OR MORE FOR ANY ITEM, CONSULT REHAB SERVICES), MLM)

## 2022-03-12 NOTE — ED PROVIDER NOTE - CHIEF COMPLAINT
The patient is a 78y Female complaining of fall.
Normal vision: sees adequately in most situations; can see medication labels, newsprint

## 2022-07-06 NOTE — ED ADULT NURSE NOTE - NS ED NURSE DC TEACHING
Otezla Pregnancy And Lactation Text: This medication is Pregnancy Category C and it isn't known if it is safe during pregnancy. It is unknown if it is excreted in breast milk. Other:/s/p fall

## 2022-10-14 NOTE — ED PROVIDER NOTE - CONSTITUTIONAL, MLM
normal... Well appearing, well nourished, awake, alert, oriented to person, place, time/situation and in no apparent distress. Speaking in clear fulls sentences smiling no nasal flaring no shoulders retractions sitting up in the stretcher appears very comfortable negative

## 2022-12-08 ENCOUNTER — INPATIENT (INPATIENT)
Facility: HOSPITAL | Age: 82
LOS: 4 days | Discharge: HOME | End: 2022-12-13
Attending: FAMILY MEDICINE | Admitting: FAMILY MEDICINE
Payer: MEDICARE

## 2022-12-08 VITALS
TEMPERATURE: 100 F | DIASTOLIC BLOOD PRESSURE: 55 MMHG | WEIGHT: 156.97 LBS | SYSTOLIC BLOOD PRESSURE: 96 MMHG | OXYGEN SATURATION: 98 % | HEIGHT: 60 IN | RESPIRATION RATE: 20 BRPM | HEART RATE: 80 BPM

## 2022-12-08 LAB
ALBUMIN SERPL ELPH-MCNC: 2.9 G/DL — LOW (ref 3.3–5)
ALP SERPL-CCNC: 57 U/L — SIGNIFICANT CHANGE UP (ref 40–120)
ALT FLD-CCNC: 14 U/L — SIGNIFICANT CHANGE UP (ref 12–78)
ANION GAP SERPL CALC-SCNC: 13 MMOL/L — SIGNIFICANT CHANGE UP (ref 5–17)
AST SERPL-CCNC: 23 U/L — SIGNIFICANT CHANGE UP (ref 15–37)
BASOPHILS # BLD AUTO: 0.02 K/UL — SIGNIFICANT CHANGE UP (ref 0–0.2)
BASOPHILS NFR BLD AUTO: 0.6 % — SIGNIFICANT CHANGE UP (ref 0–2)
BILIRUB SERPL-MCNC: 0.5 MG/DL — SIGNIFICANT CHANGE UP (ref 0.2–1.2)
BUN SERPL-MCNC: 47 MG/DL — HIGH (ref 7–23)
CALCIUM SERPL-MCNC: 9.2 MG/DL — SIGNIFICANT CHANGE UP (ref 8.5–10.1)
CHLORIDE SERPL-SCNC: 97 MMOL/L — SIGNIFICANT CHANGE UP (ref 96–108)
CO2 SERPL-SCNC: 25 MMOL/L — SIGNIFICANT CHANGE UP (ref 22–31)
CREAT SERPL-MCNC: 10.3 MG/DL — HIGH (ref 0.5–1.3)
EGFR: 3 ML/MIN/1.73M2 — LOW
EOSINOPHIL # BLD AUTO: 0.01 K/UL — SIGNIFICANT CHANGE UP (ref 0–0.5)
EOSINOPHIL NFR BLD AUTO: 0.3 % — SIGNIFICANT CHANGE UP (ref 0–6)
FLUAV AG NPH QL: SIGNIFICANT CHANGE UP
FLUBV AG NPH QL: SIGNIFICANT CHANGE UP
GLUCOSE SERPL-MCNC: 98 MG/DL — SIGNIFICANT CHANGE UP (ref 70–99)
HCT VFR BLD CALC: 34.4 % — LOW (ref 34.5–45)
HGB BLD-MCNC: 10.6 G/DL — LOW (ref 11.5–15.5)
IMM GRANULOCYTES NFR BLD AUTO: 0.3 % — SIGNIFICANT CHANGE UP (ref 0–0.9)
LACTATE SERPL-SCNC: 1.3 MMOL/L — SIGNIFICANT CHANGE UP (ref 0.7–2)
LYMPHOCYTES # BLD AUTO: 0.57 K/UL — LOW (ref 1–3.3)
LYMPHOCYTES # BLD AUTO: 17.5 % — SIGNIFICANT CHANGE UP (ref 13–44)
MCHC RBC-ENTMCNC: 30.8 G/DL — LOW (ref 32–36)
MCHC RBC-ENTMCNC: 31.5 PG — SIGNIFICANT CHANGE UP (ref 27–34)
MCV RBC AUTO: 102.4 FL — HIGH (ref 80–100)
MONOCYTES # BLD AUTO: 0.28 K/UL — SIGNIFICANT CHANGE UP (ref 0–0.9)
MONOCYTES NFR BLD AUTO: 8.6 % — SIGNIFICANT CHANGE UP (ref 2–14)
NEUTROPHILS # BLD AUTO: 2.37 K/UL — SIGNIFICANT CHANGE UP (ref 1.8–7.4)
NEUTROPHILS NFR BLD AUTO: 72.7 % — SIGNIFICANT CHANGE UP (ref 43–77)
NRBC # BLD: 0 /100 WBCS — SIGNIFICANT CHANGE UP (ref 0–0)
NT-PROBNP SERPL-SCNC: 7599 PG/ML — HIGH (ref 0–450)
PLATELET # BLD AUTO: 177 K/UL — SIGNIFICANT CHANGE UP (ref 150–400)
POTASSIUM SERPL-MCNC: 3.7 MMOL/L — SIGNIFICANT CHANGE UP (ref 3.5–5.3)
POTASSIUM SERPL-SCNC: 3.7 MMOL/L — SIGNIFICANT CHANGE UP (ref 3.5–5.3)
PROT SERPL-MCNC: 7 GM/DL — SIGNIFICANT CHANGE UP (ref 6–8.3)
RBC # BLD: 3.36 M/UL — LOW (ref 3.8–5.2)
RBC # FLD: 15.8 % — HIGH (ref 10.3–14.5)
SARS-COV-2 RNA SPEC QL NAA+PROBE: DETECTED
SODIUM SERPL-SCNC: 135 MMOL/L — SIGNIFICANT CHANGE UP (ref 135–145)
TROPONIN I, HIGH SENSITIVITY RESULT: 70 NG/L — HIGH
WBC # BLD: 3.26 K/UL — LOW (ref 3.8–10.5)
WBC # FLD AUTO: 3.26 K/UL — LOW (ref 3.8–10.5)

## 2022-12-08 PROCEDURE — 99223 1ST HOSP IP/OBS HIGH 75: CPT

## 2022-12-08 PROCEDURE — 99285 EMERGENCY DEPT VISIT HI MDM: CPT

## 2022-12-08 PROCEDURE — 71045 X-RAY EXAM CHEST 1 VIEW: CPT | Mod: 26

## 2022-12-08 PROCEDURE — 93010 ELECTROCARDIOGRAM REPORT: CPT

## 2022-12-08 RX ORDER — SODIUM CHLORIDE 9 MG/ML
500 INJECTION INTRAMUSCULAR; INTRAVENOUS; SUBCUTANEOUS ONCE
Refills: 0 | Status: COMPLETED | OUTPATIENT
Start: 2022-12-08 | End: 2022-12-08

## 2022-12-08 RX ORDER — HEPARIN SODIUM 5000 [USP'U]/ML
5000 INJECTION INTRAVENOUS; SUBCUTANEOUS EVERY 8 HOURS
Refills: 0 | Status: DISCONTINUED | OUTPATIENT
Start: 2022-12-08 | End: 2022-12-13

## 2022-12-08 RX ADMIN — SODIUM CHLORIDE 500 MILLILITER(S): 9 INJECTION INTRAMUSCULAR; INTRAVENOUS; SUBCUTANEOUS at 22:00

## 2022-12-08 NOTE — ED ADULT NURSE NOTE - NSIMPLEMENTINTERV_GEN_ALL_ED
Implemented All Fall Risk Interventions:  [place] to call system. Call bell, personal items and telephone within reach. Instruct patient to call for assistance. Room bathroom lighting operational. Non-slip footwear when patient is off stretcher. Physically safe environment: no spills, clutter or unnecessary equipment. Stretcher in lowest position, wheels locked, appropriate side rails in place. Provide visual cue, wrist band, yellow gown, etc. Monitor gait and stability. Monitor for mental status changes and reorient to person, place, and time. Review medications for side effects contributing to fall risk. Reinforce activity limits and safety measures with patient and family.

## 2022-12-08 NOTE — ED ADULT TRIAGE NOTE - CHIEF COMPLAINT QUOTE
Pt c/o weakness, as per daughter pt had HD AT 1430PM today, but couldn't go because of how she was feeling

## 2022-12-08 NOTE — ED PROVIDER NOTE - PHYSICAL EXAMINATION
Gen: Alert, NAD, slightly ill appearing  Head: NC, AT, EOMI, normal lids/conjunctiva  ENT: normal hearing, patent oropharynx without erythema/exudate, uvula midline  Neck: +supple, no tenderness/JVD, +Trachea midline  Pulm: Bilateral BS, normal resp effort, no wheeze/stridor/retractions  CV: RRR, no M/R/G, +dist pulses  Abd: soft, NT/ND, Negative Peshtigo signs, +BS, no palpable masses  Mskel: no edema/erythema/cyanosis  Skin: no rash, warm/dry  Neuro: AAOx3, no apparent sensory/motor deficits, coordination intact

## 2022-12-08 NOTE — ED PROVIDER NOTE - OBJECTIVE STATEMENT
Pertinent PMH/PSH/FHx/SHx and Review of Systems contained within:  Patient presents to the ED for weakness and failure to thrive.  Patient with daughter at bedside.  Says that patient is recently COVID + and is not doing well at home, not eating, drinking, or ambulate at baseline.  Patient was supposed to have dialysis today (TuTHSa) but was not able to go today because she was too weak. She denies increased sob or chest pain.  She fell out of bed yesterday but denies head injury, is alert and oriented x3, denies neck pain, has no focal deficits.  Patient not vaccinated against COVID.     ROS:  No photophobia/eye pain/changes in vision, No ear pain/sore throat/dysphagia, No chest pain/palpitations, no SOB/wheeze/stridor, No abdominal pain, No N/V/D/melena, no dysuria/frequency/discharge, No neck/back pain, no rash, no changes in neurological status/function. Pertinent PMH/PSH/FHx/SHx and Review of Systems contained within:  Patient presents to the ED for weakness and failure to thrive.  Patient with daughter at bedside.  Says that patient is recently COVID + and is not doing well at home, not eating, drinking, or ambulate at baseline.  Patient was supposed to have dialysis today (TuTHSa) but was not able to go today because she was too weak. She denies increased sob or chest pain.  She fell out of bed yesterday but denies head injury, is alert and oriented x3, denies neck pain, has no focal deficits.  Patient not vaccinated against COVID.  Patient is anuric.     ROS:  No photophobia/eye pain/changes in vision, No ear pain/sore throat/dysphagia, No chest pain/palpitations, no SOB/wheeze/stridor, No abdominal pain, No N/V/D/melena, no dysuria/frequency/discharge, No neck/back pain, no rash, no changes in neurological status/function.

## 2022-12-08 NOTE — ED ADULT NURSE NOTE - OBJECTIVE STATEMENT
Pt AAOx4. 82 year old female presents to ED for weakness that began today. Daughter at bedside. Per daughter, patient was COVID+ 2 weeks ago and has not been doing well at home, not eating, drinking, or ambulating at baseline. Pt did not go to Dialysis today due to weakness (T, Th, S dialysis). Denies chest pain, shortness of breath. Per pt fell out of bed yesterday, denies head trauma. Patient not vaccinated against COVID. Respirations equal and unlabored. No acute distress noted at this time. Pt placed on cardiac monitor at bedside. No neuro deficits noted. Fistula left arm.

## 2022-12-08 NOTE — ED PROVIDER NOTE - CLINICAL SUMMARY MEDICAL DECISION MAKING FREE TEXT BOX
Patient with weakness and failure to thrive at home, will need admission after labs, CXR, gentle fluid resus given, will need dialysis on admission. Patient with weakness and failure to thrive at home, will need admission after labs, CXR, gentle fluid resus given, will need dialysis on admission. Patient is to be admitted to the hospital and the case was discussed with the admitting physician.  Any changes in plan, additional imaging/labs, and further work up will be at the discretion of the admitting physician. Per discussion with admitting physician, all necessary consults for admitted patients to be obtained by morning team unless patient requires emergent intervention or medical advice by specialist overnight.

## 2022-12-09 LAB
ANION GAP SERPL CALC-SCNC: 17 MMOL/L — SIGNIFICANT CHANGE UP (ref 5–17)
BUN SERPL-MCNC: 52 MG/DL — HIGH (ref 7–23)
CALCIUM SERPL-MCNC: 8.9 MG/DL — SIGNIFICANT CHANGE UP (ref 8.5–10.1)
CHLORIDE SERPL-SCNC: 99 MMOL/L — SIGNIFICANT CHANGE UP (ref 96–108)
CK SERPL-CCNC: 44 U/L — SIGNIFICANT CHANGE UP (ref 26–192)
CO2 SERPL-SCNC: 22 MMOL/L — SIGNIFICANT CHANGE UP (ref 22–31)
CREAT SERPL-MCNC: 11 MG/DL — HIGH (ref 0.5–1.3)
EGFR: 3 ML/MIN/1.73M2 — LOW
GLUCOSE SERPL-MCNC: 73 MG/DL — SIGNIFICANT CHANGE UP (ref 70–99)
HCT VFR BLD CALC: 32.3 % — LOW (ref 34.5–45)
HGB BLD-MCNC: 10.1 G/DL — LOW (ref 11.5–15.5)
MCHC RBC-ENTMCNC: 31.3 G/DL — LOW (ref 32–36)
MCHC RBC-ENTMCNC: 32 PG — SIGNIFICANT CHANGE UP (ref 27–34)
MCV RBC AUTO: 102.2 FL — HIGH (ref 80–100)
NRBC # BLD: 0 /100 WBCS — SIGNIFICANT CHANGE UP (ref 0–0)
PLATELET # BLD AUTO: 166 K/UL — SIGNIFICANT CHANGE UP (ref 150–400)
POTASSIUM SERPL-MCNC: 3.5 MMOL/L — SIGNIFICANT CHANGE UP (ref 3.5–5.3)
POTASSIUM SERPL-SCNC: 3.5 MMOL/L — SIGNIFICANT CHANGE UP (ref 3.5–5.3)
RBC # BLD: 3.16 M/UL — LOW (ref 3.8–5.2)
RBC # FLD: 15.6 % — HIGH (ref 10.3–14.5)
SODIUM SERPL-SCNC: 138 MMOL/L — SIGNIFICANT CHANGE UP (ref 135–145)
TROPONIN I, HIGH SENSITIVITY RESULT: 75.4 NG/L — HIGH
WBC # BLD: 3.07 K/UL — LOW (ref 3.8–10.5)
WBC # FLD AUTO: 3.07 K/UL — LOW (ref 3.8–10.5)

## 2022-12-09 RX ORDER — INFLUENZA VIRUS VACCINE 15; 15; 15; 15 UG/.5ML; UG/.5ML; UG/.5ML; UG/.5ML
0.7 SUSPENSION INTRAMUSCULAR ONCE
Refills: 0 | Status: DISCONTINUED | OUTPATIENT
Start: 2022-12-09 | End: 2022-12-09

## 2022-12-09 RX ADMIN — HEPARIN SODIUM 5000 UNIT(S): 5000 INJECTION INTRAVENOUS; SUBCUTANEOUS at 13:26

## 2022-12-09 RX ADMIN — HEPARIN SODIUM 5000 UNIT(S): 5000 INJECTION INTRAVENOUS; SUBCUTANEOUS at 21:28

## 2022-12-09 RX ADMIN — HEPARIN SODIUM 5000 UNIT(S): 5000 INJECTION INTRAVENOUS; SUBCUTANEOUS at 05:09

## 2022-12-09 NOTE — CONSULT NOTE ADULT - SUBJECTIVE AND OBJECTIVE BOX
Patient chart reviewed, full consult to follow.     Thank you for the courtesy of this consultation.         Patient chart reviewed, full consult to follow.   Will arrange for HD tomorrow.    Thank you for the courtesy of this consultation. [facility] NEPHROLOGY SERVICES, [facility]  NEPHROLOGY AND HYPERTENSION  [address]  [address]  [place] [address]  [phone]    MD CRISTINE GONZALEZ MD YELENA ROSENBERG, MD BINNY KOSHY, MD CHRISTOPHER CAPUTO, MD EDWARD BOVER, MD      Information from chart:  "Patient is a 82y old  Female who presents with a chief complaint of   HPI:  83 yo female with a history of ESRD on HD which was recently changed due to COVID to Tu/Thu/Sat. Patient reports to the ED as she was too weak to go to HD today and came to the ED for further evaluation. she denies shortness of breath, denies chest pain, endorses a dry cough. Patient without hypoxia or SIRS int he ED. Medicine called to admit for missed HD. (09 Dec 2022 02:23)   "    No distress;   Weak   No sob or cough      PAST MEDICAL & SURGICAL HISTORY:  HTN (hypertension)      CKD (chronic kidney disease)      DM (diabetes mellitus)      Glaucoma      Obesity (BMI 30-39.9)      Anemia      ESRD on hemodialysis  Mon, Wens, Fri      History of cholecystectomy        FAMILY HISTORY:  No pertinent family history in first degree relatives      Allergies    No Known Allergies    Intolerances      Home Medications:  Alphagan: 1 drop(s) to each affected eye 2 times a day - 0.1% (09 Dec 2022 15:18)  aspirin 81 mg oral delayed release tablet: 1 tab(s) orally once a day (09 Dec 2022 15:18)  dorzolamide 2% ophthalmic solution: 1 drop(s) to each affected eye once a day (at bedtime) (03 Feb 2017 12:45)  ferrous sulfate 325 mg (65 mg elemental iron) oral tablet: 1 tab(s) orally every 8 hours (04 Mar 2016 02:24)    MEDICATIONS  (STANDING):  heparin   Injectable 5000 Unit(s) SubCutaneous every 8 hours    MEDICATIONS  (PRN):    Vital Signs Last 24 Hrs  T(C): 36.8 (09 Dec 2022 17:20), Max: 37.8 (09 Dec 2022 08:04)  T(F): 98.3 (09 Dec 2022 17:20), Max: 100.1 (09 Dec 2022 08:04)  HR: 70 (09 Dec 2022 17:20) (70 - 100)  BP: 112/49 (09 Dec 2022 17:20) (98/47 - 116/40)  BP(mean): 73 (09 Dec 2022 03:01) (73 - 73)  RR: 18 (09 Dec 2022 17:20) (13 - 18)  SpO2: 95% (09 Dec 2022 17:20) (95% - 99%)    Parameters below as of 09 Dec 2022 17:20  Patient On (Oxygen Delivery Method): room air        Daily     Daily     CAPILLARY BLOOD GLUCOSE        PHYSICAL EXAM:      T(C): 36.8 (12-09-22 @ 17:20), Max: 37.8 (12-09-22 @ 08:04)  HR: 70 (12-09-22 @ 17:20) (70 - 100)  BP: 112/49 (12-09-22 @ 17:20) (98/47 - 116/40)  RR: 18 (12-09-22 @ 17:20) (13 - 18)  SpO2: 95% (12-09-22 @ 17:20) (95% - 99%)  Wt(kg): --  Lungs clear  Heart S1S2  Abd soft NT ND  Extremities:   tr edema              12-09    138  |  99  |  52<H>  ----------------------------<  73  3.5   |  22  |  11.00<H>    Ca    8.9      09 Dec 2022 06:52    TPro  7.0  /  Alb  2.9<L>  /  TBili  0.5  /  DBili  x   /  AST  23  /  ALT  14  /  AlkPhos  57  12-08                          10.1   3.07  )-----------( 166      ( 09 Dec 2022 06:23 )             32.3     Creatinine Trend: 11.00<--, 10.30<--          Assessment   ESRD; weakness, fatigue, COVID +     Plan  Will arrange for HD tomorrow.  PT  Discussed with daughter at bedside;     Reinaldo Vidal MD            Thank you for the courtesy of this consultation.

## 2022-12-09 NOTE — H&P ADULT - NSHPPHYSICALEXAM_GEN_ALL_CORE
PHYSICAL EXAM:  General: in no acute distress  Eyes: PERRLA, EOMI; conjunctiva and sclera clear  Head: Normocephalic; atraumatic  ENMT: No nasal discharge; airway clear  Neck: Supple; non tender; no masses  Respiratory: No wheezes, rales or rhonchi  Cardiovascular: Regular rate and rhythm. S1 and S2 Normal; No murmurs, gallops or rubs  Gastrointestinal: Soft non-tender non-distended; Normal bowel sounds  Extremities: Normal range of motion, No clubbing, cyanosis or edema  Vascular: Peripheral pulses palpable 2+ bilaterally  Neurological: Alert and oriented x4  Skin: Warm and dry. No acute rash  Psychiatric: Cooperative and appropriate

## 2022-12-09 NOTE — H&P ADULT - NSHPREVIEWOFSYSTEMS_GEN_ALL_CORE
REVIEW OF SYSTEMS  General: +weakness, malaise; no fever/chills  Skin/Breast: no new rash  Ophthalmologic: no change in vision  ENMT: no dysphagia, throat pain or change in hearing  Respiratory and Thorax: no difficulty breathing or chest pain  Cardiovascular: no palpitations or PND, orthopnea  Gastrointestinal: no abdominal pain, normal bowel movement, no dark stools  Genitourinary: no difficulty urinating, no burning urination  Musculoskeletal: no myalgia/arthrlagia  Neurological: no weakness, numbness, change in gait  Psychiatric: no depression, anxiety  Hematology/Lymphatics: denies easy bruising or bleeding  Endocrine: no polyuria, polydipsia

## 2022-12-09 NOTE — H&P ADULT - ASSESSMENT
83 yo female with a history of ESRD on HD which was recently changed due to COVID to Tu/Thu/Sat. Patient reports to the ED as she was too weak to go to HD today and came to the ED for further evaluation. she denies shortness of breath, denies chest pain, endorses a dry cough. Patient without hypoxia or SIRS int he ED. Medicine called to admit for missed HD.    #covid-19+  - patient doesn't demonstrates any signs of active infection  - tested positive just after thanksgiving as per daughter  - no official read of CXR  - no SOB  - no hypoxia  - will not offer steroids/remdesivir for now    #ESRD on HD  - missed HD today  - no signs of fluid overload  - will consult nephrology for AM for HD    #lower extremity weakness  - decreased mobility at home  - check CPK  - will have PT evaluate    #DVT ppx  - heparin sq while here 83 yo female with a history of ESRD on HD which was recently changed due to COVID to Tu/Thu/Sat. Patient reports to the ED as she was too weak to go to HD today and came to the ED for further evaluation. she denies shortness of breath, denies chest pain, endorses a dry cough. Patient without hypoxia or SIRS int he ED. Medicine called to admit for missed HD.    #covid-19+  - patient doesn't demonstrates any signs of active infection  - tested positive just after thanksgiving as per daughter  - no official read of CXR  - no SOB  - no hypoxia  - will not offer steroids/remdesivir for now    #bicytopenia with leukopenia and anemia  - doesn't appear significantly changed from prior labs  - transfuse if hgb <7  - repeat in the AM    #ESRD on HD  - missed HD today  - no signs of fluid overload  - will consult nephrology for AM for HD    #lower extremity weakness  - decreased mobility at home  - check CPK  - will have PT evaluate    #DVT ppx  - heparin sq while here 83 yo female with a history of ESRD on HD which was recently changed due to COVID to Tu/Thu/Sat. Patient reports to the ED as she was too weak to go to HD today and came to the ED for further evaluation. she denies shortness of breath, denies chest pain, endorses a dry cough. Patient without hypoxia or SIRS int he ED. Medicine called to admit for missed HD.    #covid-19+  - patient doesn't demonstrates any signs of active infection  - tested positive just after thanksgiving as per daughter  - no official read of CXR  - no SOB  - no hypoxia  - will not offer steroids/remdesivir for now    #elevated troponin  - without chest pain or symptoms  - will check 1 more set  - elevated BNP however looks hypo to euvolemic on exam  - will therefore defer echo for now    #bicytopenia with leukopenia and anemia  - doesn't appear significantly changed from prior labs  - transfuse if hgb <7  - repeat in the AM    #ESRD on HD  - missed HD today  - no signs of fluid overload  - will consult nephrology for AM for HD    #lower extremity weakness  - decreased mobility at home  - check CPK  - will have PT evaluate    #DVT ppx  - heparin sq while here

## 2022-12-09 NOTE — H&P ADULT - HISTORY OF PRESENT ILLNESS
81 yo female with a history of ESRD on HD which was recently changed due to COVID to Tu/Thu/Sat. Patient reports to the ED as she was too weak to go to HD today and came to the ED for further evaluation. she denies shortness of breath, denies chest pain, endorses a dry cough. Patient without hypoxia or SIRS int he ED. Medicine called to admit for missed HD.

## 2022-12-09 NOTE — CHART NOTE - NSCHARTNOTEFT_GEN_A_CORE
seen and examined  hd per nephro  Pt c/s     Vital Signs Last 24 Hrs  T(C): 36.9 (09 Dec 2022 08:05), Max: 37.8 (09 Dec 2022 08:04)  T(F): 98.5 (09 Dec 2022 08:05), Max: 100.1 (09 Dec 2022 08:04)  HR: 100 (09 Dec 2022 08:05) (70 - 100)  BP: 107/47 (09 Dec 2022 08:05) (96/55 - 116/40)  BP(mean): 73 (09 Dec 2022 03:01) (73 - 73)  RR: 15 (09 Dec 2022 08:05) (13 - 20)  SpO2: 97% (09 Dec 2022 08:05) (97% - 99%)    Parameters below as of 09 Dec 2022 08:05  Patient On (Oxygen Delivery Method): room air                          10.1   3.07  )-----------( 166      ( 09 Dec 2022 06:23 )             32.3     12-09    138  |  99  |  52<H>  ----------------------------<  73  3.5   |  22  |  11.00<H>    Ca    8.9      09 Dec 2022 06:52    TPro  7.0  /  Alb  2.9<L>  /  TBili  0.5  /  DBili  x   /  AST  23  /  ALT  14  /  AlkPhos  57  12-08

## 2022-12-10 PROCEDURE — 99232 SBSQ HOSP IP/OBS MODERATE 35: CPT

## 2022-12-10 RX ORDER — ACETAMINOPHEN 500 MG
650 TABLET ORAL ONCE
Refills: 0 | Status: COMPLETED | OUTPATIENT
Start: 2022-12-10 | End: 2022-12-10

## 2022-12-10 RX ADMIN — HEPARIN SODIUM 5000 UNIT(S): 5000 INJECTION INTRAVENOUS; SUBCUTANEOUS at 21:52

## 2022-12-10 RX ADMIN — HEPARIN SODIUM 5000 UNIT(S): 5000 INJECTION INTRAVENOUS; SUBCUTANEOUS at 05:31

## 2022-12-10 NOTE — PHYSICAL THERAPY INITIAL EVALUATION ADULT - ADDITIONAL COMMENTS
As per dtr at bed side, pt resides in a apartment with dtr with 9 steps to entet with b/l rails, once inside there are no other steps, she has tub shower, no grab bars but planning to put one, standard toilet, she uses rollator indoors, RW outdoors(PT stated to use the RW indoors, rollator outdoors for dialysis)

## 2022-12-10 NOTE — PROGRESS NOTE ADULT - SUBJECTIVE AND OBJECTIVE BOX
NEPHROLOGY PROGRESS NOTE    CHIEF COMPLAINT:  ESRD    HPI:  No fever.  94% on RA.      EXAM:  T(F): 98.5 (12-10-22 @ 04:22)  HR: 70 (12-10-22 @ 04:22)  BP: 107/47 (12-10-22 @ 04:22)  RR: 20 (12-10-22 @ 04:22)  SpO2: 94% (12-10-22 @ 04:22)    Conversant, in no apparent distress  Normal respiratory effort, lungs clear bilaterally  Heart RRR with no murmur, no peripheral edema         LABS                             10.1   3.07  )-----------( 166      ( 09 Dec 2022 06:23 )             32.3          12-09    138  |  99  |  52<H>  ----------------------------<  73  3.5   |  22  |  11.00<H>    Ca    8.9      09 Dec 2022 06:52    TPro  7.0  /  Alb  2.9<L>  /  TBili  0.5  /  DBili  x   /  AST  23  /  ALT  14  /  AlkPhos  57  12-08           Assessment   ESRD; weakness, fatigue, COVID +     Plan  HD later today as ordered

## 2022-12-10 NOTE — PROGRESS NOTE ADULT - ASSESSMENT
81 yo female with a history of ESRD on HD which was recently changed due to COVID to Tu/Thu/Sat. Patient reports to the ED as she was too weak to go to HD today and came to the ED for further evaluation. she denies shortness of breath, denies chest pain, endorses a dry cough. Patient without hypoxia or SIRS int he ED. Medicine called to admit for missed HD.    #covid-19+  - patient doesn't demonstrates any signs of active infection  - tested positive just after thanksgiving as per zev  - no SOB  - no hypoxia  - will not offer steroids/remdesivir for now    #elevated troponin  - without chest pain or symptoms  likely 2/2 esrd        #bicytopenia with leukopenia and anemia  - doesn't appear significantly changed from prior labs  - transfuse if hgb <7       #ESRD on HD  - HD today   - no signs of fluid overload       #lower extremity weakness  - decreased mobility at home  - will have PT evaluate    #DVT ppx  - heparin sq while here

## 2022-12-10 NOTE — PROGRESS NOTE ADULT - SUBJECTIVE AND OBJECTIVE BOX
Patient is a 82y old  Female who presents with a chief complaint of   INTERVAL HPI/OVERNIGHT EVENTS: no events    MEDICATIONS  (STANDING):  heparin   Injectable 5000 Unit(s) SubCutaneous every 8 hours    MEDICATIONS  (PRN):    Allergies    No Known Allergies    Intolerances      REVIEW OF SYSTEMS:  All other systems reviewed and are negative    Vital Signs Last 24 Hrs  T(C): 36.9 (10 Dec 2022 04:22), Max: 36.9 (10 Dec 2022 04:22)  T(F): 98.5 (10 Dec 2022 04:22), Max: 98.5 (10 Dec 2022 04:22)  HR: 70 (10 Dec 2022 04:22) (68 - 80)  BP: 107/47 (10 Dec 2022 04:22) (107/47 - 121/54)  BP(mean): --  RR: 20 (10 Dec 2022 04:22) (17 - 20)  SpO2: 94% (10 Dec 2022 04:22) (94% - 95%)    Parameters below as of 10 Dec 2022 04:22  Patient On (Oxygen Delivery Method): room air      Daily     Daily   I&O's Summary    CAPILLARY BLOOD GLUCOSE        PHYSICAL EXAM:  GENERAL: NAD,    HEAD:  Atraumatic, Normocephalic  EYES: EOMI, PERRLA, conjunctiva and sclera clear  ENMT: No tonsillar erythema, exudates, or enlargement; Moist mucous membranes, Good dentition, No lesions  NECK: Supple, No JVD, Normal thyroid  NERVOUS SYSTEM:  Alert & Oriented X3, Good concentration; Motor Strength 5/5 B/L upper and lower extremities; DTRs 2+ intact and symmetric  CHEST/LUNG: Clear to percussion bilaterally; No rales, rhonchi, wheezing, or rubs  HEART: Regular rate and rhythm; No murmurs, rubs, or gallops  ABDOMEN: Soft, Nontender, Nondistended; Bowel sounds present  EXTREMITIES:  2+ Peripheral Pulses, No clubbing, cyanosis, or edema  LYMPH: No lymphadenopathy noted  SKIN: No rashes or lesions    Labs                          10.1   3.07  )-----------( 166      ( 09 Dec 2022 06:23 )             32.3     12-09    138  |  99  |  52<H>  ----------------------------<  73  3.5   |  22  |  11.00<H>    Ca    8.9      09 Dec 2022 06:52    TPro  7.0  /  Alb  2.9<L>  /  TBili  0.5  /  DBili  x   /  AST  23  /  ALT  14  /  AlkPhos  57  12-08      CARDIAC MARKERS ( 09 Dec 2022 06:52 )  x     / x     / 44 U/L / x     / x              Culture - Blood (collected 08 Dec 2022 20:20)  Source: .Blood Blood-Peripheral  Preliminary Report (10 Dec 2022 01:02):    No growth to date.    Culture - Blood (collected 08 Dec 2022 19:50)  Source: .Blood Blood-Peripheral  Preliminary Report (10 Dec 2022 01:02):    No growth to date.                DVT prophylaxis: > Lovenox 40mg SQ daily  > Heparin   > SCD's

## 2022-12-10 NOTE — PHYSICAL THERAPY INITIAL EVALUATION ADULT - BALANCE TRAINING, PT EVAL
Pt will improve static & dynamic standing balance to Good using [Rolling walker]  to perform ADL, Gait independently in 1 week

## 2022-12-11 LAB
HCT VFR BLD CALC: 31.6 % — LOW (ref 34.5–45)
HGB BLD-MCNC: 9.9 G/DL — LOW (ref 11.5–15.5)
MCHC RBC-ENTMCNC: 31.3 G/DL — LOW (ref 32–36)
MCHC RBC-ENTMCNC: 32 PG — SIGNIFICANT CHANGE UP (ref 27–34)
MCV RBC AUTO: 102.3 FL — HIGH (ref 80–100)
NRBC # BLD: 0 /100 WBCS — SIGNIFICANT CHANGE UP (ref 0–0)
PLATELET # BLD AUTO: 154 K/UL — SIGNIFICANT CHANGE UP (ref 150–400)
RBC # BLD: 3.09 M/UL — LOW (ref 3.8–5.2)
RBC # FLD: 15.9 % — HIGH (ref 10.3–14.5)
WBC # BLD: 3.22 K/UL — LOW (ref 3.8–10.5)
WBC # FLD AUTO: 3.22 K/UL — LOW (ref 3.8–10.5)

## 2022-12-11 PROCEDURE — 99232 SBSQ HOSP IP/OBS MODERATE 35: CPT

## 2022-12-11 RX ORDER — CALCIUM ACETATE 667 MG
667 TABLET ORAL
Refills: 0 | Status: DISCONTINUED | OUTPATIENT
Start: 2022-12-11 | End: 2022-12-13

## 2022-12-11 RX ORDER — FERROUS SULFATE 325(65) MG
325 TABLET ORAL DAILY
Refills: 0 | Status: DISCONTINUED | OUTPATIENT
Start: 2022-12-11 | End: 2022-12-13

## 2022-12-11 RX ORDER — ASPIRIN/CALCIUM CARB/MAGNESIUM 324 MG
81 TABLET ORAL DAILY
Refills: 0 | Status: DISCONTINUED | OUTPATIENT
Start: 2022-12-11 | End: 2022-12-13

## 2022-12-11 RX ORDER — DORZOLAMIDE HYDROCHLORIDE 20 MG/ML
1 SOLUTION/ DROPS OPHTHALMIC ONCE
Refills: 0 | Status: COMPLETED | OUTPATIENT
Start: 2022-12-11 | End: 2022-12-11

## 2022-12-11 RX ORDER — ATORVASTATIN CALCIUM 80 MG/1
40 TABLET, FILM COATED ORAL AT BEDTIME
Refills: 0 | Status: DISCONTINUED | OUTPATIENT
Start: 2022-12-11 | End: 2022-12-13

## 2022-12-11 RX ORDER — CARVEDILOL PHOSPHATE 80 MG/1
3.12 CAPSULE, EXTENDED RELEASE ORAL EVERY 12 HOURS
Refills: 0 | Status: DISCONTINUED | OUTPATIENT
Start: 2022-12-11 | End: 2022-12-12

## 2022-12-11 RX ORDER — PANTOPRAZOLE SODIUM 20 MG/1
40 TABLET, DELAYED RELEASE ORAL
Refills: 0 | Status: DISCONTINUED | OUTPATIENT
Start: 2022-12-11 | End: 2022-12-13

## 2022-12-11 RX ADMIN — Medication 667 MILLIGRAM(S): at 12:52

## 2022-12-11 RX ADMIN — Medication 650 MILLIGRAM(S): at 02:30

## 2022-12-11 RX ADMIN — Medication 667 MILLIGRAM(S): at 19:11

## 2022-12-11 RX ADMIN — ATORVASTATIN CALCIUM 40 MILLIGRAM(S): 80 TABLET, FILM COATED ORAL at 22:30

## 2022-12-11 RX ADMIN — HEPARIN SODIUM 5000 UNIT(S): 5000 INJECTION INTRAVENOUS; SUBCUTANEOUS at 22:30

## 2022-12-11 RX ADMIN — CARVEDILOL PHOSPHATE 3.12 MILLIGRAM(S): 80 CAPSULE, EXTENDED RELEASE ORAL at 19:12

## 2022-12-11 RX ADMIN — Medication 81 MILLIGRAM(S): at 12:52

## 2022-12-11 RX ADMIN — HEPARIN SODIUM 5000 UNIT(S): 5000 INJECTION INTRAVENOUS; SUBCUTANEOUS at 13:33

## 2022-12-11 RX ADMIN — Medication 650 MILLIGRAM(S): at 00:11

## 2022-12-11 RX ADMIN — HEPARIN SODIUM 5000 UNIT(S): 5000 INJECTION INTRAVENOUS; SUBCUTANEOUS at 05:22

## 2022-12-11 RX ADMIN — Medication 325 MILLIGRAM(S): at 12:52

## 2022-12-11 RX ADMIN — DORZOLAMIDE HYDROCHLORIDE 1 DROP(S): 20 SOLUTION/ DROPS OPHTHALMIC at 19:12

## 2022-12-11 NOTE — PROGRESS NOTE ADULT - ASSESSMENT
81 yo female with a history of ESRD on HD which was recently changed due to COVID to Tu/Thu/Sat. Patient reports to the ED as she was too weak to go to HD today and came to the ED for further evaluation. she denies shortness of breath, denies chest pain, endorses a dry cough. Patient without hypoxia or SIRS int he ED. Medicine called to admit for missed HD.    #covid-19+  - patient doesn't demonstrates any signs of active infection  - tested positive just after thanksgiving as per zev  - no SOB  - no hypoxia  - will not offer steroids/remdesivir for now    #elevated troponin  - without chest pain or symptoms  likely 2/2 esrd        #bicytopenia with leukopenia and anemia  - doesn't appear significantly changed from prior labs  - transfuse if hgb <7       #ESRD on HD  - HD today   - no signs of fluid overload       #lower extremity weakness  - decreased mobility at home  - will have PT evaluate    #DVT ppx  - heparin sq while here 81 yo female with a history of ESRD on HD which was recently changed due to COVID to Tu/Thu/Sat. Patient reports to the ED as she was too weak to go to HD today and came to the ED for further evaluation. she denies shortness of breath, denies chest pain, endorses a dry cough. Patient without hypoxia or SIRS int he ED. Medicine called to admit for missed HD.    #covid-19+    - patient doesn't demonstrates any signs of active infection  - tested positive just after thanksgiving as per daughter  - no SOB  - no hypoxia  - will not offer steroids/remdesivir for now    #elevated troponin  - without chest pain or symptoms  likely 2/2 esrd        #bicytopenia with leukopenia and anemia  - doesn't appear significantly changed from prior labs  - transfuse if hgb <7       #ESRD on HD  - HD today   - no signs of fluid overload       #lower extremity weakness  - decreased mobility at home  - will have PT evaluate    #DVT ppx  - heparin sq while here 83 yo female with a history of ESRD on HD which was recently changed due to COVID to Tu/Thu/Sat. Patient reports to the ED as she was too weak to go to HD today and came to the ED for further evaluation. she denies shortness of breath, denies chest pain, endorses a dry cough. Patient without hypoxia or SIRS int he ED. Medicine called to admit for missed HD.    #covid-19+    - patient doesn't demonstrates any signs of active infection  - tested positive just after thanksgiving as per daughter  - no SOB, no hypoxia, will not offer steroids/remdesivir for now    #elevated troponin  - without chest pain or symptoms  likely 2/2 esrd        #bicytopenia with leukopenia and anemia  - doesn't appear significantly changed from prior labs  - transfuse if hgb <7. Continue daily ferrous sulfate as per home meds.        #ESRD on HD  - HD today   - no signs of fluid overload       #lower extremity weakness  - decreased mobility at home  - will have PT evaluate      Addendum: Added in home meds of Phoslo, Coreg, Protonix, ASA, Lipitor and Glaucoma eye drop.     Discharge home Monday, has left arm AV fistula. Would like to resume regular HD schedule of Mon, Wed, Friday at center.

## 2022-12-11 NOTE — PROGRESS NOTE ADULT - SUBJECTIVE AND OBJECTIVE BOX
INTERVAL HPI/OVERNIGHT EVENTS:  Pt seen and examined at bedside.     Allergies/Intolerance: No Known Allergies      MEDICATIONS  (STANDING):  heparin   Injectable 5000 Unit(s) SubCutaneous every 8 hours    MEDICATIONS  (PRN):        ROS: all systems reviewed and wnl      PHYSICAL EXAMINATION:  Vital Signs Last 24 Hrs  T(C): 36.6 (11 Dec 2022 05:05), Max: 37.9 (10 Dec 2022 23:30)  T(F): 97.9 (11 Dec 2022 05:05), Max: 100.2 (10 Dec 2022 23:30)  HR: 71 (11 Dec 2022 05:05) (62 - 84)  BP: 139/61 (11 Dec 2022 05:05) (103/46 - 139/61)  BP(mean): --  RR: 18 (11 Dec 2022 05:05) (18 - 20)  SpO2: 96% (11 Dec 2022 05:05) (95% - 100%)    Parameters below as of 10 Dec 2022 20:24  Patient On (Oxygen Delivery Method): room air      CAPILLARY BLOOD GLUCOSE            GENERAL:   NECK: supple, No JVD  CHEST/LUNG: clear to auscultation bilaterally; no rales, rhonchi, or wheezing b/l  HEART: normal S1, S2  ABDOMEN: BS+, soft, ND, NT   EXTREMITIES:  pulses palpable; no clubbing, cyanosis, or edema b/l LEs  SKIN: no rashes or lesions      LABS:                     INTERVAL HPI/OVERNIGHT EVENTS:  Pt seen and examined at bedside.     Allergies/Intolerance: No Known Allergies      MEDICATIONS  (STANDING):  heparin   Injectable 5000 Unit(s) SubCutaneous every 8 hours    MEDICATIONS  (PRN):        ROS: all systems reviewed and wnl      PHYSICAL EXAMINATION:  Vital Signs Last 24 Hrs  T(C): 36.6 (11 Dec 2022 05:05), Max: 37.9 (10 Dec 2022 23:30)  T(F): 97.9 (11 Dec 2022 05:05), Max: 100.2 (10 Dec 2022 23:30)  HR: 71 (11 Dec 2022 05:05) (62 - 84)  BP: 139/61 (11 Dec 2022 05:05) (103/46 - 139/61)  BP(mean): --  RR: 18 (11 Dec 2022 05:05) (18 - 20)  SpO2: 96% (11 Dec 2022 05:05) (95% - 100%)    Parameters below as of 10 Dec 2022 20:24  Patient On (Oxygen Delivery Method): room air      CAPILLARY BLOOD GLUCOSE            GENERAL: stable, in bed, alert, comfortable, no fevers or SOB  NECK: supple, No JVD  CHEST/LUNG: clear to auscultation bilaterally; no rales, rhonchi, or wheezing b/l  HEART: normal S1, S2  ABDOMEN: BS+, soft, ND, NT   EXTREMITIES:  pulses palpable; no clubbing, cyanosis, or edema b/l LEs  SKIN: no rashes or lesions      LABS:

## 2022-12-12 DIAGNOSIS — N18.6 END STAGE RENAL DISEASE: ICD-10-CM

## 2022-12-12 LAB
ALBUMIN SERPL ELPH-MCNC: 2.5 G/DL — LOW (ref 3.3–5)
ALP SERPL-CCNC: 52 U/L — SIGNIFICANT CHANGE UP (ref 40–120)
ALT FLD-CCNC: 11 U/L — LOW (ref 12–78)
ANION GAP SERPL CALC-SCNC: 10 MMOL/L — SIGNIFICANT CHANGE UP (ref 5–17)
AST SERPL-CCNC: 19 U/L — SIGNIFICANT CHANGE UP (ref 15–37)
BILIRUB SERPL-MCNC: 0.3 MG/DL — SIGNIFICANT CHANGE UP (ref 0.2–1.2)
BUN SERPL-MCNC: 48 MG/DL — HIGH (ref 7–23)
CALCIUM SERPL-MCNC: 8.9 MG/DL — SIGNIFICANT CHANGE UP (ref 8.5–10.1)
CHLORIDE SERPL-SCNC: 102 MMOL/L — SIGNIFICANT CHANGE UP (ref 96–108)
CO2 SERPL-SCNC: 26 MMOL/L — SIGNIFICANT CHANGE UP (ref 22–31)
CREAT SERPL-MCNC: 11.2 MG/DL — HIGH (ref 0.5–1.3)
EGFR: 3 ML/MIN/1.73M2 — LOW
GLUCOSE SERPL-MCNC: 116 MG/DL — HIGH (ref 70–99)
POTASSIUM SERPL-MCNC: 3.1 MMOL/L — LOW (ref 3.5–5.3)
POTASSIUM SERPL-SCNC: 3.1 MMOL/L — LOW (ref 3.5–5.3)
PROT SERPL-MCNC: 6.3 GM/DL — SIGNIFICANT CHANGE UP (ref 6–8.3)
SODIUM SERPL-SCNC: 138 MMOL/L — SIGNIFICANT CHANGE UP (ref 135–145)

## 2022-12-12 PROCEDURE — 99233 SBSQ HOSP IP/OBS HIGH 50: CPT

## 2022-12-12 RX ORDER — MIDODRINE HYDROCHLORIDE 2.5 MG/1
10 TABLET ORAL THREE TIMES A DAY
Refills: 0 | Status: DISCONTINUED | OUTPATIENT
Start: 2022-12-12 | End: 2022-12-13

## 2022-12-12 RX ORDER — SODIUM CHLORIDE 9 MG/ML
250 INJECTION, SOLUTION INTRAVENOUS ONCE
Refills: 0 | Status: COMPLETED | OUTPATIENT
Start: 2022-12-12 | End: 2022-12-12

## 2022-12-12 RX ORDER — POTASSIUM CHLORIDE 20 MEQ
40 PACKET (EA) ORAL ONCE
Refills: 0 | Status: COMPLETED | OUTPATIENT
Start: 2022-12-12 | End: 2022-12-12

## 2022-12-12 RX ADMIN — SODIUM CHLORIDE 250 MILLILITER(S): 9 INJECTION, SOLUTION INTRAVENOUS at 11:26

## 2022-12-12 RX ADMIN — ATORVASTATIN CALCIUM 40 MILLIGRAM(S): 80 TABLET, FILM COATED ORAL at 21:20

## 2022-12-12 RX ADMIN — Medication 667 MILLIGRAM(S): at 17:16

## 2022-12-12 RX ADMIN — Medication 40 MILLIEQUIVALENT(S): at 22:36

## 2022-12-12 RX ADMIN — Medication 325 MILLIGRAM(S): at 17:17

## 2022-12-12 RX ADMIN — HEPARIN SODIUM 5000 UNIT(S): 5000 INJECTION INTRAVENOUS; SUBCUTANEOUS at 06:11

## 2022-12-12 RX ADMIN — PANTOPRAZOLE SODIUM 40 MILLIGRAM(S): 20 TABLET, DELAYED RELEASE ORAL at 08:38

## 2022-12-12 RX ADMIN — Medication 667 MILLIGRAM(S): at 08:37

## 2022-12-12 RX ADMIN — Medication 667 MILLIGRAM(S): at 12:38

## 2022-12-12 RX ADMIN — HEPARIN SODIUM 5000 UNIT(S): 5000 INJECTION INTRAVENOUS; SUBCUTANEOUS at 14:38

## 2022-12-12 RX ADMIN — HEPARIN SODIUM 5000 UNIT(S): 5000 INJECTION INTRAVENOUS; SUBCUTANEOUS at 21:19

## 2022-12-12 RX ADMIN — Medication 81 MILLIGRAM(S): at 12:16

## 2022-12-12 NOTE — PROGRESS NOTE ADULT - ASSESSMENT
83 yo female with a history of ESRD on HD which was recently changed due to COVID to Tu/Thu/Sat. Patient reports to the ED as she was too weak to go to HD today and came to the ED for further evaluation. she denies shortness of breath, denies chest pain, endorses a dry cough. Patient without hypoxia or SIRS int he ED. Medicine called to admit for missed HD.    #covid-19+  - patient doesn't demonstrates any signs of active infection  - tested positive just after thanksgiving as per daughter  - no SOB  - no hypoxia  - will not offer steroids/remdesivir for now    #elevated troponin  - without chest pain or symptoms  - likely 2/2 esrd      #bicytopenia with leukopenia and anemia  - doesn't appear significantly changed from prior labs  - transfuse if hgb <7       #ESRD on HD  - HD today 12/12  - no signs of fluid overload     #lower extremity weakness  - decreased mobility at home  - PT recs Home PT    #DVT ppx  - heparin sq while here    #Dispo:  -  has left arm AV fistula. Would like to resume regular HD schedule of Mon, Wed, Friday at center.     - medically cleared for discharge. Awaiting re-initiation of HD in the community (d/w CM and SW)

## 2022-12-12 NOTE — PROGRESS NOTE ADULT - SUBJECTIVE AND OBJECTIVE BOX
[facility] NEPHROLOGY SERVICES, [facility]  NEPHROLOGY AND HYPERTENSION  [address]  [address]  [place] [address]  [phone]    MD CRISTINE GONZALEZ, MD REGINE LANDRY, MD VALERY JONES, MD ANSLEY PERAZA, MD TOBIAS KANG MD          Patient events noted  No distress    MEDICATIONS  (STANDING):  aspirin enteric coated 81 milliGRAM(s) Oral daily  atorvastatin 40 milliGRAM(s) Oral at bedtime  calcium acetate 667 milliGRAM(s) Oral three times a day with meals  carvedilol 3.125 milliGRAM(s) Oral every 12 hours  ferrous    sulfate 325 milliGRAM(s) Oral daily  heparin   Injectable 5000 Unit(s) SubCutaneous every 8 hours  pantoprazole    Tablet 40 milliGRAM(s) Oral before breakfast    MEDICATIONS  (PRN):      12-11-22 @ 07:01  -  12-12-22 @ 07:00  --------------------------------------------------------  IN: 260 mL / OUT: 0 mL / NET: 260 mL      PHYSICAL EXAM:      T(C): 36.3 (12-12-22 @ 21:25), Max: 37.1 (12-11-22 @ 23:25)  HR: 77 (12-12-22 @ 21:25) (67 - 81)  BP: 107/57 (12-12-22 @ 21:25) (90/46 - 109/57)  RR: 18 (12-12-22 @ 21:25) (17 - 18)  SpO2: 97% (12-12-22 @ 21:25) (96% - 100%)  Wt(kg): --  Lungs clear  Heart S1S2  Abd soft NT ND  Extremities:   tr edema                                    9.9    3.22  )-----------( 154      ( 11 Dec 2022 10:45 )             31.6     12-12    138  |  102  |  48<H>  ----------------------------<  116<H>  3.1<L>   |  26  |  11.20<H>    Ca    8.9      12 Dec 2022 18:20    TPro  6.3  /  Alb  2.5<L>  /  TBili  0.3  /  DBili  x   /  AST  19  /  ALT  11<L>  /  AlkPhos  52  12-12      LIVER FUNCTIONS - ( 12 Dec 2022 18:20 )  Alb: 2.5 g/dL / Pro: 6.3 gm/dL / ALK PHOS: 52 U/L / ALT: 11 U/L / AST: 19 U/L / GGT: x           Creatinine Trend: 11.20<--, 11.00<--, 10.30<--      Assessment   ESRD; maintenance     Plan:  HD for today  UF zero  Midodrine support   Paraprotein screen   Replete K    Reinaldo Vidal MD

## 2022-12-12 NOTE — PROGRESS NOTE ADULT - SUBJECTIVE AND OBJECTIVE BOX
[facility] Division of Hospital Medicine  Caty Mcdermott MD  Available via MS Teams  Pager: [phone]    SUBJECTIVE / OVERNIGHT EVENTS:  - has no complaints at this time. denies n/v/abd pain/sob/cough/headaches     ADDITIONAL REVIEW OF SYSTEMS:    MEDICATIONS  (STANDING):  aspirin enteric coated 81 milliGRAM(s) Oral daily  atorvastatin 40 milliGRAM(s) Oral at bedtime  calcium acetate 667 milliGRAM(s) Oral three times a day with meals  carvedilol 3.125 milliGRAM(s) Oral every 12 hours  ferrous    sulfate 325 milliGRAM(s) Oral daily  heparin   Injectable 5000 Unit(s) SubCutaneous every 8 hours  pantoprazole    Tablet 40 milliGRAM(s) Oral before breakfast    MEDICATIONS  (PRN):      I&O's Summary    11 Dec 2022 07:01  -  12 Dec 2022 07:00  --------------------------------------------------------  IN: 260 mL / OUT: 0 mL / NET: 260 mL        PHYSICAL EXAM:  Vital Signs Last 24 Hrs  T(C): 36.7 (12 Dec 2022 17:24), Max: 37.1 (11 Dec 2022 23:25)  T(F): 98 (12 Dec 2022 17:24), Max: 98.8 (11 Dec 2022 23:25)  HR: 67 (12 Dec 2022 17:24) (67 - 81)  BP: 102/59 (12 Dec 2022 17:24) (90/46 - 109/57)  BP(mean): --  RR: 18 (12 Dec 2022 17:24) (18 - 18)  SpO2: 100% (12 Dec 2022 17:24) (96% - 100%)    Parameters below as of 12 Dec 2022 17:20  Patient On (Oxygen Delivery Method): room air      PHYSICAL EXAM:  GENERAL: NAD,    HEAD:  Atraumatic, Normocephalic  EYES: EOMI, PERRLA, conjunctiva and sclera clear  ENMT: No tonsillar erythema, exudates, or enlargement; Moist mucous membranes, Good dentition, No lesions  NECK: Supple, No JVD, Normal thyroid  NERVOUS SYSTEM:  Alert & Oriented X3, Good concentration; Motor Strength 5/5 B/L upper and lower extremities; DTRs 2+ intact and symmetric  CHEST/LUNG: Clear to percussion bilaterally; No rales, rhonchi, wheezing, or rubs  HEART: Regular rate and rhythm; No murmurs, rubs, or gallops  ABDOMEN: Soft, Nontender, Nondistended; Bowel sounds present  EXTREMITIES:  2+ Peripheral Pulses, No clubbing, cyanosis, or edema  LYMPH: No lymphadenopathy noted  SKIN: No rashes or lesions    LABS:                        9.9    3.22  )-----------( 154      ( 11 Dec 2022 10:45 )             31.6       RADIOLOGY & ADDITIONAL TESTS:  New Results Reviewed Today:   New Imaging Personally Reviewed Today:  New Electrocardiogram Personally Reviewed Today:  Prior or Outpatient Records Reviewed Today:    COMMUNICATION:  Care Discussed with Consultants/Other Providers and Details of Discussion:  Discussions with Patient/Family:  PCP Communication:

## 2022-12-13 ENCOUNTER — TRANSCRIPTION ENCOUNTER (OUTPATIENT)
Age: 82
End: 2022-12-13

## 2022-12-13 VITALS — SYSTOLIC BLOOD PRESSURE: 102 MMHG | DIASTOLIC BLOOD PRESSURE: 61 MMHG | TEMPERATURE: 97 F | HEART RATE: 79 BPM

## 2022-12-13 LAB
ALBUMIN SERPL ELPH-MCNC: 2.4 G/DL — LOW (ref 3.3–5)
ALP SERPL-CCNC: 51 U/L — SIGNIFICANT CHANGE UP (ref 40–120)
ALT FLD-CCNC: 12 U/L — SIGNIFICANT CHANGE UP (ref 12–78)
ANION GAP SERPL CALC-SCNC: 7 MMOL/L — SIGNIFICANT CHANGE UP (ref 5–17)
ANISOCYTOSIS BLD QL: SLIGHT — SIGNIFICANT CHANGE UP
AST SERPL-CCNC: 19 U/L — SIGNIFICANT CHANGE UP (ref 15–37)
BASOPHILS # BLD AUTO: 0.01 K/UL — SIGNIFICANT CHANGE UP (ref 0–0.2)
BASOPHILS NFR BLD AUTO: 0.3 % — SIGNIFICANT CHANGE UP (ref 0–2)
BILIRUB SERPL-MCNC: 0.3 MG/DL — SIGNIFICANT CHANGE UP (ref 0.2–1.2)
BUN SERPL-MCNC: 22 MG/DL — SIGNIFICANT CHANGE UP (ref 7–23)
CALCIUM SERPL-MCNC: 9 MG/DL — SIGNIFICANT CHANGE UP (ref 8.5–10.1)
CHLORIDE SERPL-SCNC: 107 MMOL/L — SIGNIFICANT CHANGE UP (ref 96–108)
CO2 SERPL-SCNC: 29 MMOL/L — SIGNIFICANT CHANGE UP (ref 22–31)
CREAT SERPL-MCNC: 6.91 MG/DL — HIGH (ref 0.5–1.3)
EGFR: 6 ML/MIN/1.73M2 — LOW
EOSINOPHIL # BLD AUTO: 0.05 K/UL — SIGNIFICANT CHANGE UP (ref 0–0.5)
EOSINOPHIL NFR BLD AUTO: 1.6 % — SIGNIFICANT CHANGE UP (ref 0–6)
GLUCOSE SERPL-MCNC: 89 MG/DL — SIGNIFICANT CHANGE UP (ref 70–99)
HCT VFR BLD CALC: 29.4 % — LOW (ref 34.5–45)
HGB BLD-MCNC: 9 G/DL — LOW (ref 11.5–15.5)
IGA FLD-MCNC: 158 MG/DL — SIGNIFICANT CHANGE UP (ref 84–499)
IGG FLD-MCNC: 689 MG/DL — SIGNIFICANT CHANGE UP (ref 610–1660)
IGM SERPL-MCNC: 27 MG/DL — LOW (ref 35–242)
IMM GRANULOCYTES NFR BLD AUTO: 1.3 % — HIGH (ref 0–0.9)
KAPPA LC SER QL IFE: 15.44 MG/DL — HIGH (ref 0.33–1.94)
KAPPA/LAMBDA FREE LIGHT CHAIN RATIO, SERUM: 0.96 RATIO — SIGNIFICANT CHANGE UP (ref 0.26–1.65)
LAMBDA LC SER QL IFE: 16.12 MG/DL — HIGH (ref 0.57–2.63)
LYMPHOCYTES # BLD AUTO: 0.69 K/UL — LOW (ref 1–3.3)
LYMPHOCYTES # BLD AUTO: 22.5 % — SIGNIFICANT CHANGE UP (ref 13–44)
MAGNESIUM SERPL-MCNC: 2.2 MG/DL — SIGNIFICANT CHANGE UP (ref 1.6–2.6)
MANUAL SMEAR VERIFICATION: YES — SIGNIFICANT CHANGE UP
MCHC RBC-ENTMCNC: 30.6 G/DL — LOW (ref 32–36)
MCHC RBC-ENTMCNC: 31.8 PG — SIGNIFICANT CHANGE UP (ref 27–34)
MCV RBC AUTO: 103.9 FL — HIGH (ref 80–100)
MONOCYTES # BLD AUTO: 0.23 K/UL — SIGNIFICANT CHANGE UP (ref 0–0.9)
MONOCYTES NFR BLD AUTO: 7.5 % — SIGNIFICANT CHANGE UP (ref 2–14)
NEUTROPHILS # BLD AUTO: 2.05 K/UL — SIGNIFICANT CHANGE UP (ref 1.8–7.4)
NEUTROPHILS NFR BLD AUTO: 66.8 % — SIGNIFICANT CHANGE UP (ref 43–77)
NRBC # BLD: 0 /100 WBCS — SIGNIFICANT CHANGE UP (ref 0–0)
OVALOCYTES BLD QL SMEAR: SLIGHT — SIGNIFICANT CHANGE UP
PHOSPHATE SERPL-MCNC: 2.5 MG/DL — SIGNIFICANT CHANGE UP (ref 2.5–4.5)
PLAT MORPH BLD: NORMAL — SIGNIFICANT CHANGE UP
PLATELET # BLD AUTO: 152 K/UL — SIGNIFICANT CHANGE UP (ref 150–400)
POTASSIUM SERPL-MCNC: 3.9 MMOL/L — SIGNIFICANT CHANGE UP (ref 3.5–5.3)
POTASSIUM SERPL-SCNC: 3.9 MMOL/L — SIGNIFICANT CHANGE UP (ref 3.5–5.3)
PROT SERPL-MCNC: 5.5 G/DL — LOW (ref 6–8.3)
PROT SERPL-MCNC: 5.5 G/DL — LOW (ref 6–8.3)
PROT SERPL-MCNC: 6 GM/DL — SIGNIFICANT CHANGE UP (ref 6–8.3)
RBC # BLD: 2.83 M/UL — LOW (ref 3.8–5.2)
RBC # FLD: 15.6 % — HIGH (ref 10.3–14.5)
RBC BLD AUTO: ABNORMAL
SODIUM SERPL-SCNC: 143 MMOL/L — SIGNIFICANT CHANGE UP (ref 135–145)
WBC # BLD: 3.07 K/UL — LOW (ref 3.8–10.5)
WBC # FLD AUTO: 3.07 K/UL — LOW (ref 3.8–10.5)

## 2022-12-13 RX ORDER — FERROUS SULFATE 325(65) MG
1 TABLET ORAL
Qty: 0 | Refills: 0 | DISCHARGE
Start: 2022-12-13

## 2022-12-13 RX ORDER — ATORVASTATIN CALCIUM 80 MG/1
1 TABLET, FILM COATED ORAL
Qty: 0 | Refills: 0 | DISCHARGE
Start: 2022-12-13

## 2022-12-13 RX ORDER — ASPIRIN/CALCIUM CARB/MAGNESIUM 324 MG
1 TABLET ORAL
Qty: 0 | Refills: 0 | DISCHARGE
Start: 2022-12-13

## 2022-12-13 RX ORDER — MIDODRINE HYDROCHLORIDE 2.5 MG/1
1 TABLET ORAL
Qty: 90 | Refills: 0
Start: 2022-12-13 | End: 2023-01-11

## 2022-12-13 RX ADMIN — HEPARIN SODIUM 5000 UNIT(S): 5000 INJECTION INTRAVENOUS; SUBCUTANEOUS at 05:46

## 2022-12-13 RX ADMIN — Medication 81 MILLIGRAM(S): at 13:56

## 2022-12-13 RX ADMIN — Medication 667 MILLIGRAM(S): at 12:54

## 2022-12-13 RX ADMIN — Medication 325 MILLIGRAM(S): at 12:55

## 2022-12-13 RX ADMIN — Medication 667 MILLIGRAM(S): at 09:14

## 2022-12-13 NOTE — DISCHARGE NOTE NURSING/CASE MANAGEMENT/SOCIAL WORK - PATIENT PORTAL LINK FT
You can access the FollowMyHealth Patient Portal offered by [facility] by registering at the following website: [email or web address]. By joining [employer]’s FollowMyHealth portal, you will also be able to view your health information using other applications (apps) compatible with our system.

## 2022-12-13 NOTE — DISCHARGE NOTE NURSING/CASE MANAGEMENT/SOCIAL WORK - NSDCPEFALRISK_GEN_ALL_CORE
For information on Fall & Injury Prevention, visit: [email or web address] OR  [email or web address] OR  https://www.cdc.gov/steadi/patient.html

## 2022-12-13 NOTE — DISCHARGE NOTE PROVIDER - ATTENDING DISCHARGE PHYSICAL EXAMINATION:
GENERAL: NAD,    HEAD:  Atraumatic, Normocephalic  EYES: EOMI, PERRLA, conjunctiva and sclera clear  ENMT: No tonsillar erythema, exudates, or enlargement; Moist mucous membranes, Good dentition, No lesions  NECK: Supple, No JVD,   NERVOUS SYSTEM:  Alert & Oriented X3, Good concentration; Motor Strength 5/5 B/L upper and lower extremities; DTRs 2+ intact and symmetric  CHEST/LUNG: Clear to percussion bilaterally; No rales, rhonchi, wheezing, or rubs  HEART: Regular rate and rhythm; No murmurs, rubs, or gallops  ABDOMEN: Soft, Nontender, Nondistended; Bowel sounds present  EXTREMITIES:  2+ Peripheral Pulses, No clubbing, cyanosis, or edema  SKIN: No rashes or lesions

## 2022-12-13 NOTE — DISCHARGE NOTE PROVIDER - HOSPITAL COURSE
83 yo female with a history of ESRD on HD which was recently changed due to COVID to Tu/Thu/Sat. Patient reports to the ED as she was too weak to go to HD today and came to the ED for further evaluation. she denies shortness of breath, denies chest pain, endorses a dry cough. Patient without hypoxia or SIRS int he ED. Medicine called to admit for missed HD.    #covid-19+  - patient doesn't demonstrates any signs of active infection  - tested positive just after thanksgiving as per daughter  - no SOB  - no hypoxia  - will not offer steroids/remdesivir for now, continues to be doing well- on room air     #elevated troponin  - without chest pain or symptoms  - likely 2/2 esrd      #bicytopenia with leukopenia and anemia  - doesn't appear significantly changed from prior labs  - transfuse if hgb <7       #ESRD on HD  - HD today 12/12  - no signs of fluid overload     #lower extremity weakness  - decreased mobility at home  - PT recs Home PT    Pt is medically and hemodynamically cleared for discharge.

## 2022-12-13 NOTE — DISCHARGE NOTE PROVIDER - NSDCCPCAREPLAN_GEN_ALL_CORE_FT
PRINCIPAL DISCHARGE DIAGNOSIS  Diagnosis: COVID  Assessment and Plan of Treatment:       SECONDARY DISCHARGE DIAGNOSES  Diagnosis: Generalized weakness  Assessment and Plan of Treatment:

## 2022-12-13 NOTE — DISCHARGE NOTE PROVIDER - CARE PROVIDER_API CALL
Reinaldo Vidal)  Internal Medicine; Nephrology  [address] [facility], [address] [place] [id number]  Phone: [phone]  Fax: [phone]  Follow Up Time: 2 weeks

## 2022-12-13 NOTE — DISCHARGE NOTE PROVIDER - NSDCMRMEDTOKEN_GEN_ALL_CORE_FT
Alphagan: 1 drop(s) to each affected eye 2 times a day - 0.1%  aspirin 81 mg oral delayed release tablet: 1 tab(s) orally once a day  atorvastatin 40 mg oral tablet: 1 tab(s) orally once a day (at bedtime)  calcium acetate 667 mg oral tablet: 2 tab(s) orally 3 times a day  dorzolamide 2% ophthalmic solution: 1 drop(s) to each affected eye once a day (at bedtime)  ferrous sulfate 325 mg (65 mg elemental iron) oral tablet: 1 tab(s) orally once a day  midodrine 10 mg oral tablet: 1 tab(s) orally 3 times a day, As needed, SBP less than 100  pantoprazole 40 mg oral delayed release tablet: 1 tab(s) orally once a day (before a meal)

## 2022-12-17 LAB
% ALBUMIN: 48.8 % — SIGNIFICANT CHANGE UP
% ALPHA 1: 8.4 % — SIGNIFICANT CHANGE UP
% ALPHA 2: 20.9 % — SIGNIFICANT CHANGE UP
% BETA: 11 % — SIGNIFICANT CHANGE UP
% GAMMA: 10.9 % — SIGNIFICANT CHANGE UP
ALBUMIN SERPL ELPH-MCNC: 2.7 G/DL — LOW (ref 3.6–5.5)
ALBUMIN/GLOB SERPL ELPH: 1 RATIO — SIGNIFICANT CHANGE UP
ALPHA1 GLOB SERPL ELPH-MCNC: 0.5 G/DL — HIGH (ref 0.1–0.4)
ALPHA2 GLOB SERPL ELPH-MCNC: 1.1 G/DL — HIGH (ref 0.5–1)
B-GLOBULIN SERPL ELPH-MCNC: 0.6 G/DL — SIGNIFICANT CHANGE UP (ref 0.5–1)
GAMMA GLOBULIN: 0.6 G/DL — SIGNIFICANT CHANGE UP (ref 0.6–1.6)
INTERPRETATION SERPL IFE-IMP: SIGNIFICANT CHANGE UP
PROT PATTERN SERPL ELPH-IMP: SIGNIFICANT CHANGE UP

## 2022-12-18 DIAGNOSIS — R62.7 ADULT FAILURE TO THRIVE: ICD-10-CM

## 2022-12-18 DIAGNOSIS — N18.6 END STAGE RENAL DISEASE: ICD-10-CM

## 2022-12-18 DIAGNOSIS — E11.22 TYPE 2 DIABETES MELLITUS WITH DIABETIC CHRONIC KIDNEY DISEASE: ICD-10-CM

## 2022-12-18 DIAGNOSIS — D61.818 OTHER PANCYTOPENIA: ICD-10-CM

## 2022-12-18 DIAGNOSIS — H40.9 UNSPECIFIED GLAUCOMA: ICD-10-CM

## 2022-12-18 DIAGNOSIS — U07.1 COVID-19: ICD-10-CM

## 2022-12-18 DIAGNOSIS — Z28.310 UNVACCINATED FOR COVID-19: ICD-10-CM

## 2022-12-18 DIAGNOSIS — Z99.2 DEPENDENCE ON RENAL DIALYSIS: ICD-10-CM

## 2023-01-29 NOTE — ED ADULT TRIAGE NOTE - AS HEIGHT TYPE
Bed: WWED-09  Expected date: 1/29/23  Expected time: 11:05 AM  Means of arrival: Ambulance  Comments:  Jaki ems hypoglycemic   stated Pleuritic chest pain

## 2023-03-02 NOTE — ED PROVIDER NOTE - NSTIMEPROVIDERCAREINITIATE_GEN_ER
Patient needs a diabetic transmitter and sensor. Nothing to be picked up in the front. Patient would like a call back. Thank you.  
24-Mar-2018 21:56

## 2023-07-20 NOTE — ED PROVIDER NOTE - CADM POA PRESS ULCER
July 20, 2023      [facility] - OB/GYN  [address] [facility] [address]  Phone: [phone]  Fax: [phone]       Patient: Vilma Trujillo   YOB: 1979  Date of Visit: 7/11/2023    To Whom It May Concern:    Jenny Trujillo  had surgery at [facility] on 07/11/2023. The patient may return to work/school on 09/05/2023 with no restrictions. If you have any questions or concerns, or if I can be of further assistance, please do not hesitate to contact me.    Sincerely,    Jo Ann Green RN      No

## 2023-09-27 NOTE — ED PROVIDER NOTE - CONSTITUTIONAL NEGATIVE STATEMENT, MLM
Want to Say “Thank You” to a Nurse?  The JENNIFER Award® was created in memory of MILADY Schaeffer by his family to say thank you to bedside nurses who provide an outstanding level of care.    Submit a nomination using any method below.     OR    [email or web address]  Or visit the Resource section   on your [employer] alexa           BREAST SURGERY DISCHARGE INSTRUCTIONS:  -Remove outer dressings after 48 hours.    -Do not submerge incisions underwater.  -Cover drain sites with gauze daily and after showers.  -Strip drains twice daily and as needed whenever the drains are half full. Measure and keep a record of the drain output and call the surgeon's office when the output is less than 30mL in 24 hours (if multiple drains, call whenever one of the drains is less than 30mL in 24 hrs) to discuss drain removal with the nurse.  -No driving until after the drains are removed or if you are taking narcotic pain medications.  -Activity as tolerated.    -Take pain medication as prescribed.  -No diet restrictions.  -Drink plenty of fluids to avoid constipation while taking pain medication. If constipated use over-the-counter stool softener medications.  -Raise arm over head 10 times an hour while awake.  -Follow up with Dr. Goddard in 2 weeks. Call for appointment.  [place] [phone]  Laisha Kumar [phone]           no fever and no chills.

## 2024-07-08 ENCOUNTER — EMERGENCY (EMERGENCY)
Facility: HOSPITAL | Age: 84
LOS: 0 days | Discharge: HOME | End: 2024-07-08
Attending: EMERGENCY MEDICINE
Payer: MEDICARE

## 2024-07-08 VITALS
SYSTOLIC BLOOD PRESSURE: 103 MMHG | OXYGEN SATURATION: 98 % | HEART RATE: 67 BPM | TEMPERATURE: 98 F | DIASTOLIC BLOOD PRESSURE: 60 MMHG | WEIGHT: 156.09 LBS | RESPIRATION RATE: 19 BRPM

## 2024-07-08 DIAGNOSIS — N18.6 END STAGE RENAL DISEASE: ICD-10-CM

## 2024-07-08 DIAGNOSIS — Z91.158 PATIENT'S NONCOMPLIANCE WITH RENAL DIALYSIS FOR OTHER REASON: ICD-10-CM

## 2024-07-08 DIAGNOSIS — R60.0 LOCALIZED EDEMA: ICD-10-CM

## 2024-07-08 DIAGNOSIS — Z99.2 DEPENDENCE ON RENAL DIALYSIS: ICD-10-CM

## 2024-07-08 DIAGNOSIS — E87.6 HYPOKALEMIA: ICD-10-CM

## 2024-07-08 LAB
ANION GAP SERPL CALC-SCNC: 13 MMOL/L — SIGNIFICANT CHANGE UP (ref 5–17)
BUN SERPL-MCNC: 35 MG/DL — HIGH (ref 7–23)
CALCIUM SERPL-MCNC: 9.7 MG/DL — SIGNIFICANT CHANGE UP (ref 8.5–10.1)
CHLORIDE SERPL-SCNC: 95 MMOL/L — LOW (ref 96–108)
CO2 SERPL-SCNC: 30 MMOL/L — SIGNIFICANT CHANGE UP (ref 22–31)
CREAT SERPL-MCNC: 9.86 MG/DL — HIGH (ref 0.5–1.3)
EGFR: 4 ML/MIN/1.73M2 — LOW
GLUCOSE SERPL-MCNC: 145 MG/DL — HIGH (ref 70–99)
HCT VFR BLD CALC: 37.4 % — SIGNIFICANT CHANGE UP (ref 34.5–45)
HGB BLD-MCNC: 12.1 G/DL — SIGNIFICANT CHANGE UP (ref 11.5–15.5)
MCHC RBC-ENTMCNC: 32.4 G/DL — SIGNIFICANT CHANGE UP (ref 32–36)
MCHC RBC-ENTMCNC: 32.4 PG — SIGNIFICANT CHANGE UP (ref 27–34)
MCV RBC AUTO: 100 FL — SIGNIFICANT CHANGE UP (ref 80–100)
NRBC # BLD: 0 /100 WBCS — SIGNIFICANT CHANGE UP (ref 0–0)
PLATELET # BLD AUTO: 137 K/UL — LOW (ref 150–400)
POTASSIUM SERPL-MCNC: 3.1 MMOL/L — LOW (ref 3.5–5.3)
POTASSIUM SERPL-SCNC: 3.1 MMOL/L — LOW (ref 3.5–5.3)
RBC # BLD: 3.74 M/UL — LOW (ref 3.8–5.2)
RBC # FLD: 14.6 % — HIGH (ref 10.3–14.5)
SODIUM SERPL-SCNC: 138 MMOL/L — SIGNIFICANT CHANGE UP (ref 135–145)
WBC # BLD: 3.91 K/UL — SIGNIFICANT CHANGE UP (ref 3.8–10.5)
WBC # FLD AUTO: 3.91 K/UL — SIGNIFICANT CHANGE UP (ref 3.8–10.5)

## 2024-07-08 PROCEDURE — 99285 EMERGENCY DEPT VISIT HI MDM: CPT

## 2025-06-05 NOTE — ED ADULT NURSE NOTE - CADM POA URETHRAL CATHETER
Received report from Elijah VEGAS. RN writer is aware of Treatment plan. Patient Aox4 with no acute distress noted. Patient has no complaints of pain at this time. Vitals WNL. Baby swaddled bedside. Safety measures in place.   No

## 2025-06-10 ENCOUNTER — INPATIENT (INPATIENT)
Facility: HOSPITAL | Age: 85
LOS: 0 days | Discharge: HOME | End: 2025-06-11
Attending: HOSPITALIST | Admitting: HOSPITALIST
Payer: MEDICARE

## 2025-06-10 VITALS
RESPIRATION RATE: 19 BRPM | TEMPERATURE: 98 F | OXYGEN SATURATION: 98 % | SYSTOLIC BLOOD PRESSURE: 96 MMHG | WEIGHT: 154.98 LBS | HEART RATE: 75 BPM | DIASTOLIC BLOOD PRESSURE: 55 MMHG | HEIGHT: 60 IN

## 2025-06-10 DIAGNOSIS — E78.5 HYPERLIPIDEMIA, UNSPECIFIED: ICD-10-CM

## 2025-06-10 DIAGNOSIS — N18.6 END STAGE RENAL DISEASE: ICD-10-CM

## 2025-06-10 DIAGNOSIS — R26.2 DIFFICULTY IN WALKING, NOT ELSEWHERE CLASSIFIED: ICD-10-CM

## 2025-06-10 LAB
ALBUMIN SERPL ELPH-MCNC: 2.6 G/DL — LOW (ref 3.3–5)
ALP SERPL-CCNC: 67 U/L — SIGNIFICANT CHANGE UP (ref 40–120)
ALT FLD-CCNC: 7 U/L — LOW (ref 12–78)
ANION GAP SERPL CALC-SCNC: 12 MMOL/L — SIGNIFICANT CHANGE UP (ref 5–17)
APTT BLD: 28.5 SEC — SIGNIFICANT CHANGE UP (ref 26.1–36.8)
AST SERPL-CCNC: 12 U/L — LOW (ref 15–37)
BASOPHILS # BLD AUTO: 0.02 K/UL — SIGNIFICANT CHANGE UP (ref 0–0.2)
BASOPHILS NFR BLD AUTO: 0.4 % — SIGNIFICANT CHANGE UP (ref 0–2)
BILIRUB SERPL-MCNC: 0.4 MG/DL — SIGNIFICANT CHANGE UP (ref 0.2–1.2)
BUN SERPL-MCNC: 55 MG/DL — HIGH (ref 7–23)
CALCIUM SERPL-MCNC: 9.9 MG/DL — SIGNIFICANT CHANGE UP (ref 8.5–10.1)
CHLORIDE SERPL-SCNC: 92 MMOL/L — LOW (ref 96–108)
CO2 SERPL-SCNC: 25 MMOL/L — SIGNIFICANT CHANGE UP (ref 22–31)
CREAT SERPL-MCNC: 11.4 MG/DL — HIGH (ref 0.5–1.3)
EGFR: 3 ML/MIN/1.73M2 — LOW
EGFR: 3 ML/MIN/1.73M2 — LOW
EOSINOPHIL # BLD AUTO: 0.02 K/UL — SIGNIFICANT CHANGE UP (ref 0–0.5)
EOSINOPHIL NFR BLD AUTO: 0.4 % — SIGNIFICANT CHANGE UP (ref 0–6)
FLUAV AG NPH QL: SIGNIFICANT CHANGE UP
FLUBV AG NPH QL: SIGNIFICANT CHANGE UP
GLUCOSE SERPL-MCNC: 136 MG/DL — HIGH (ref 70–99)
HCT VFR BLD CALC: 30.5 % — LOW (ref 34.5–45)
HGB BLD-MCNC: 9.9 G/DL — LOW (ref 11.5–15.5)
IMM GRANULOCYTES NFR BLD AUTO: 0.2 % — SIGNIFICANT CHANGE UP (ref 0–0.9)
INR BLD: 1.22 RATIO — HIGH (ref 0.85–1.16)
LACTATE SERPL-SCNC: 0.9 MMOL/L — SIGNIFICANT CHANGE UP (ref 0.7–2)
LYMPHOCYTES # BLD AUTO: 0.69 K/UL — LOW (ref 1–3.3)
LYMPHOCYTES # BLD AUTO: 13 % — SIGNIFICANT CHANGE UP (ref 13–44)
MCHC RBC-ENTMCNC: 32.1 PG — SIGNIFICANT CHANGE UP (ref 27–34)
MCHC RBC-ENTMCNC: 32.5 G/DL — SIGNIFICANT CHANGE UP (ref 32–36)
MCV RBC AUTO: 99 FL — SIGNIFICANT CHANGE UP (ref 80–100)
MONOCYTES # BLD AUTO: 0.69 K/UL — SIGNIFICANT CHANGE UP (ref 0–0.9)
MONOCYTES NFR BLD AUTO: 13 % — SIGNIFICANT CHANGE UP (ref 2–14)
NEUTROPHILS # BLD AUTO: 3.88 K/UL — SIGNIFICANT CHANGE UP (ref 1.8–7.4)
NEUTROPHILS NFR BLD AUTO: 73 % — SIGNIFICANT CHANGE UP (ref 43–77)
NRBC BLD AUTO-RTO: 0 /100 WBCS — SIGNIFICANT CHANGE UP (ref 0–0)
PLATELET # BLD AUTO: 224 K/UL — SIGNIFICANT CHANGE UP (ref 150–400)
POTASSIUM SERPL-MCNC: 3.4 MMOL/L — LOW (ref 3.5–5.3)
POTASSIUM SERPL-SCNC: 3.4 MMOL/L — LOW (ref 3.5–5.3)
PROT SERPL-MCNC: 7.1 GM/DL — SIGNIFICANT CHANGE UP (ref 6–8.3)
PROTHROM AB SERPL-ACNC: 13.8 SEC — HIGH (ref 9.9–13.4)
RBC # BLD: 3.08 M/UL — LOW (ref 3.8–5.2)
RBC # FLD: 15.5 % — HIGH (ref 10.3–14.5)
RSV RNA NPH QL NAA+NON-PROBE: SIGNIFICANT CHANGE UP
SARS-COV-2 RNA SPEC QL NAA+PROBE: SIGNIFICANT CHANGE UP
SODIUM SERPL-SCNC: 129 MMOL/L — LOW (ref 135–145)
SOURCE RESPIRATORY: SIGNIFICANT CHANGE UP
WBC # BLD: 5.31 K/UL — SIGNIFICANT CHANGE UP (ref 3.8–10.5)
WBC # FLD AUTO: 5.31 K/UL — SIGNIFICANT CHANGE UP (ref 3.8–10.5)

## 2025-06-10 PROCEDURE — 99222 1ST HOSP IP/OBS MODERATE 55: CPT

## 2025-06-10 PROCEDURE — 93010 ELECTROCARDIOGRAM REPORT: CPT

## 2025-06-10 PROCEDURE — 73660 X-RAY EXAM OF TOE(S): CPT | Mod: 26,XE,LT

## 2025-06-10 PROCEDURE — 73630 X-RAY EXAM OF FOOT: CPT | Mod: 26,LT

## 2025-06-10 PROCEDURE — 71045 X-RAY EXAM CHEST 1 VIEW: CPT | Mod: 26

## 2025-06-10 PROCEDURE — 99285 EMERGENCY DEPT VISIT HI MDM: CPT

## 2025-06-10 RX ORDER — ACETAMINOPHEN 500 MG/5ML
650 LIQUID (ML) ORAL EVERY 6 HOURS
Refills: 0 | Status: DISCONTINUED | OUTPATIENT
Start: 2025-06-10 | End: 2025-06-11

## 2025-06-10 RX ORDER — ATORVASTATIN CALCIUM 80 MG/1
40 TABLET, FILM COATED ORAL AT BEDTIME
Refills: 0 | Status: DISCONTINUED | OUTPATIENT
Start: 2025-06-10 | End: 2025-06-11

## 2025-06-10 RX ORDER — MELATONIN 5 MG
3 TABLET ORAL AT BEDTIME
Refills: 0 | Status: DISCONTINUED | OUTPATIENT
Start: 2025-06-10 | End: 2025-06-11

## 2025-06-10 RX ORDER — ACETAMINOPHEN 500 MG/5ML
1000 LIQUID (ML) ORAL ONCE
Refills: 0 | Status: COMPLETED | OUTPATIENT
Start: 2025-06-10 | End: 2025-06-10

## 2025-06-10 RX ORDER — ASPIRIN 325 MG
81 TABLET ORAL DAILY
Refills: 0 | Status: DISCONTINUED | OUTPATIENT
Start: 2025-06-10 | End: 2025-06-11

## 2025-06-10 RX ORDER — MIDODRINE HYDROCHLORIDE 5 MG/1
10 TABLET ORAL THREE TIMES A DAY
Refills: 0 | Status: DISCONTINUED | OUTPATIENT
Start: 2025-06-10 | End: 2025-06-11

## 2025-06-10 RX ORDER — HEPARIN SODIUM 1000 [USP'U]/ML
5000 INJECTION INTRAVENOUS; SUBCUTANEOUS EVERY 12 HOURS
Refills: 0 | Status: DISCONTINUED | OUTPATIENT
Start: 2025-06-10 | End: 2025-06-11

## 2025-06-10 RX ORDER — FERROUS SULFATE 137(45) MG
325 TABLET, EXTENDED RELEASE ORAL DAILY
Refills: 0 | Status: DISCONTINUED | OUTPATIENT
Start: 2025-06-10 | End: 2025-06-11

## 2025-06-10 RX ADMIN — HEPARIN SODIUM 5000 UNIT(S): 1000 INJECTION INTRAVENOUS; SUBCUTANEOUS at 18:42

## 2025-06-10 RX ADMIN — Medication 1000 MILLILITER(S): at 13:08

## 2025-06-10 RX ADMIN — Medication 1334 MILLIGRAM(S): at 16:51

## 2025-06-10 RX ADMIN — ATORVASTATIN CALCIUM 40 MILLIGRAM(S): 80 TABLET, FILM COATED ORAL at 21:37

## 2025-06-10 RX ADMIN — Medication 1000 MILLIGRAM(S): at 13:38

## 2025-06-10 RX ADMIN — Medication 400 MILLIGRAM(S): at 13:08

## 2025-06-10 NOTE — ED ADULT NURSE NOTE - OBJECTIVE STATEMENT
85 year old female hx ESRD, HTN presents to ED c/o generalized weakness, poor appetite, and left foot pain since friday. Patient was last dialysis friday. AV fistula to LA

## 2025-06-10 NOTE — H&P ADULT - NSHPPHYSICALEXAM_GEN_ALL_CORE
CONSTITUTIONAL: alert and cooperative, no acute distress.   EYES: PERRL, no scleral icterus  ENT: Mucosa moist, tongue normal  NECK: Neck supple, trachea midline, non-tender  CARDIAC: Normal S1 and S2. Regular rate and rhythms. No Pedal edema  LUNGS: Equal air entry both lungs. No rales, rhonchi, wheezing. Normal respiratory effort.   ABDOMEN: Soft, nondistended, nontender. No guarding or rebound tenderness. No hepatomegaly or splenomegaly. Bowel sound normal.   MUSCULOSKELETAL: Normocephalic, atraumatic. No significant deformity or joint abnormality. S/P left big toe nail removal. No sign of infection  NEUROLOGICAL: No gross motor or sensory deficits  SKIN: no lesions or eruptions. Normal turgor  PSYCHIATRIC: A&O x 3, appropriate mood and affect.

## 2025-06-10 NOTE — ED ADULT NURSE NOTE - NSFALLHARMRISKINTERV_ED_ALL_ED

## 2025-06-10 NOTE — H&P ADULT - PROBLEM SELECTOR PLAN 1
decrease oral intake, generalized weakness and impaired ambulation.  CXR  ( I personally review and interpreted the images) no focal consolidation  Xray left foot  ( I personally review and interpreted the images) does not appear to have acute pathology  Patient if following with podiatry for left big toe pain  PT consult

## 2025-06-10 NOTE — CONSULT NOTE ADULT - SUBJECTIVE AND OBJECTIVE BOX
[facility] NEPHROLOGY SERVICES, [facility]  NEPHROLOGY AND HYPERTENSION  [address]  [address]  [place] [address]  [phone]    MD CRISTINE GONZALEZ MD YELENA ROSENBERG, MD BINNY KOSHY, MD CHRISTOPHER CAPUTO, MD EDWARD BOVER, MD      Information from chart:  "Patient is a 85y old  Female who presents with a chief complaint of generalized weakness/impaired ambulation, missed dialysis (10 Jones 2025 16:07)    HPI:  85 years old female with h/o HLD, ESRD on MWF dialysis, GERD present to ED with complain of generalized weakness and missed dialysis. Patient reported decrease oral intake for last 3-4 days associated with generalized weakness. Due to generalized weakness, patient is not able to ambulate. Patient missed Monday dialysis. No fever, chills, cough, SOB, nausea, vomiting or diarrhea. Patient has left big toe pain, following with podiatry.  Hemodynamically stable, afebrile, sat well at RA. No leukocytosis, Na 129, K 3.4. Flu/COVID/RSV negative (10 Jones 2025 16:07)   "    PAST MEDICAL & SURGICAL HISTORY:  HTN (hypertension)      CKD (chronic kidney disease)      DM (diabetes mellitus)      Glaucoma      Obesity (BMI 30-39.9)      Anemia      ESRD on hemodialysis  Mon, Wens, Fri      History of cholecystectomy        FAMILY HISTORY:    Allergies    No Known Allergies    Intolerances      Home Medications:  Alphagan: 1 drop(s) to each affected eye 2 times a day - 0.1% (09 Dec 2022 15:18)  aspirin 81 mg oral delayed release tablet: 1 tab(s) orally once a day (13 Dec 2022 12:47)  atorvastatin 40 mg oral tablet: 1 tab(s) orally once a day (at bedtime) (13 Dec 2022 12:47)  dorzolamide 2% ophthalmic solution: 1 drop(s) to each affected eye once a day (at bedtime) (09 Dec 2022 19:53)  ferrous sulfate 325 mg (65 mg elemental iron) oral tablet: 1 tab(s) orally once a day (13 Dec 2022 12:47)    MEDICATIONS  (STANDING):  aspirin enteric coated 81 milliGRAM(s) Oral daily  atorvastatin 40 milliGRAM(s) Oral at bedtime  calcium acetate 1334 milliGRAM(s) Oral three times a day with meals  ferrous    sulfate 325 milliGRAM(s) Oral daily  heparin   Injectable 5000 Unit(s) SubCutaneous every 12 hours    MEDICATIONS  (PRN):  acetaminophen     Tablet .. 650 milliGRAM(s) Oral every 6 hours PRN Mild Pain (1 - 3), Moderate Pain (4 - 6)  melatonin 3 milliGRAM(s) Oral at bedtime PRN Insomnia  midodrine. 10 milliGRAM(s) Oral three times a day PRN SBP less than 100    Vital Signs Last 24 Hrs  T(C): 36.3 (10 Jones 2025 19:17), Max: 36.8 (10 Jones 2025 11:25)  T(F): 97.4 (10 Jones 2025 19:17), Max: 98.3 (10 Jones 2025 11:25)  HR: 108 (10 Jones 2025 19:17) (69 - 108)  BP: 92/53 (10 Jones 2025 19:17) (92/53 - 102/49)  BP(mean): --  RR: 18 (10 Jones 2025 19:17) (17 - 19)  SpO2: 94% (10 Jones 2025 19:17) (94% - 100%)    Parameters below as of 10 Jones 2025 19:17  Patient On (Oxygen Delivery Method): room air        Daily Height in cm: 152.4 (10 Jones 2025 11:25)    Daily     CAPILLARY BLOOD GLUCOSE        PHYSICAL EXAM:      T(C): 36.3 (06-10-25 @ 19:17), Max: 36.8 (06-10-25 @ 11:25)  HR: 108 (06-10-25 @ 19:17) (69 - 108)  BP: 92/53 (06-10-25 @ 19:17) (92/53 - 102/49)  RR: 18 (06-10-25 @ 19:17) (17 - 19)  SpO2: 94% (06-10-25 @ 19:17) (94% - 100%)  Wt(kg): --  Lungs clear  Heart S1S2  Abd soft NT ND  Extremities:   tr edema              06-10    129[L]  |  92[L]  |  55[H]  ----------------------------<  136[H]  3.4[L]   |  25  |  11.40[H]    Ca    9.9      10 Jones 2025 13:10    TPro  7.1  /  Alb  2.6[L]  /  TBili  0.4  /  DBili  x   /  AST  12[L]  /  ALT  7[L]  /  AlkPhos  67  06-10                          9.9    5.31  )-----------( 224      ( 10 Jones 2025 13:10 )             30.5     Creatinine Trend: 11.40<--  Urinalysis Basic - ( 10 Jones 2025 13:10 )    Color: x / Appearance: x / SG: x / pH: x  Gluc: 136 mg/dL / Ketone: x  / Bili: x / Urobili: x   Blood: x / Protein: x / Nitrite: x   Leuk Esterase: x / RBC: x / WBC x   Sq Epi: x / Non Sq Epi: x / Bacteria: x            Assessment   ESRD, weakness, recent toe infection, on po abx  Plan  PT eval  Will arrange for HD tomorrow  Midodrine prn     Reinaldo Vidal MD

## 2025-06-10 NOTE — H&P ADULT - ASSESSMENT
85 years old female with h/o HLD, ESRD on MWF dialysis, GERD present to ED with complain of generalized weakness and missed dialysis. Patient reported decrease oral intake for last 3-4 days associated with generalized weakness. Due to generalized weakness, patient is not able to ambulate. Patient missed Monday dialysis. No fever, chills, cough, SOB, nausea, vomiting or diarrhea. Patient has left big toe pain, following with podiatry.  Hemodynamically stable, afebrile, sat well at RA. No leukocytosis, Na 129, K 3.4. Flu/COVID/RSV negative

## 2025-06-10 NOTE — H&P ADULT - NSHPREVIEWOFSYSTEMS_GEN_ALL_CORE
All ROS were negative except loss off appetite, decrease oral intake, generalized weakness, left big toe pain, missed dialysis

## 2025-06-10 NOTE — PHYSICAL THERAPY INITIAL EVALUATION ADULT - PERTINENT HX OF CURRENT PROBLEM, REHAB EVAL
85 years old female with h/o HLD, ESRD on MWF dialysis, GERD present to ED with complain of generalized weakness and missed dialysis. Patient reported decrease oral intake for last 3-4 days associated with generalized weakness. Due to generalized weakness, patient is not able to ambulate. Patient missed Monday dialysis. No fever, chills, cough, SOB, nausea, vomiting or diarrhea. Patient has left big toe pain, following with podiatry.

## 2025-06-10 NOTE — ED PROVIDER NOTE - OBJECTIVE STATEMENT
[address] [address] Follow Up Call Qualifying Dx. Joint Replacement    2020      Patient Name: Angela Day         Patient : 1935     Discharge Date: 10/30/[facility].. Instructed importance of obtaining this. V/U  States his mom is doing good. Future Appointments   Date Time Provider Zohaib Urban   2021 11:30 AM Kylee Rivera MD [facility]       Care Transitions will continue to follow.
see mdm

## 2025-06-10 NOTE — ED PROVIDER NOTE - CLINICAL SUMMARY MEDICAL DECISION MAKING FREE TEXT BOX
86yo F pmhx of CKD on dialysis (M, W, F) (missed Monday dialysis session) comes to ED w/ generalized weakness, decreased appetite for the last 3 to 4 days. Started randomly, due to weakness and decreased appetite patient missed her dialysis session on Monday, patient was rescheduled for today and missed it due to similar symptoms. Their pain/symptom is moderate to severe, constant, non mediating with rest. Patient reports left toe pain, recent nail removal due to fungal infection.  Otherwise ROS negative.    General: Tired appearing  HEENT: NCAT, PERRL   Cardiac: RRR, no murmurs, 2+ peripheral pulses   Chest: CTAB   Abdomen: soft, non-distended, bowel sounds present, no ttp, no rebound or guarding   Extremities: Tenderness to palpation of the first toe, visible nail removal of the first toe on the left foot.  No peripheral edema, calf tenderness, or leg size discrepancies   Skin: no rashes   Neuro: AAOx4, 5+motor, sensory grossly intact   Psych: mood and affect appropriate    Impression: 86yo F pmhx of CKD on dialysis (M, W, F) (missed Monday dialysis session) comes to ED w/ generalized weakness, decreased appetite for the last 3 to 4 days. Their symptoms and exam findings are concerning for metabolic abnormality, viral illness, other infection.  Plan to eval for infection, due to missed dialysis session plan to admit for dialysis.    Ordered labs, imaging, medications for diagnosis, management, and treatment.

## 2025-06-10 NOTE — PHYSICAL THERAPY INITIAL EVALUATION ADULT - GENERAL OBSERVATIONS, REHAB EVAL
Chart (EMR) reviewed. Received in ERHO supine in a stretcher c HOB elevated, NAD. +telemetry, +heplock, +LUE precautions. Daughter(Glenna) present. Alert. Ox3. Able to follow simple commands/directions.

## 2025-06-10 NOTE — PATIENT PROFILE ADULT - FALL HARM RISK - HARM RISK INTERVENTIONS

## 2025-06-10 NOTE — PATIENT PROFILE ADULT - NSTRANSFERBELONGINGSRESP_GEN_A_NUR
yes Pt states that lower ext edema occurs while on prednisone  - Checked venous doppler - negative  - ECHO 2/2018 showed Preserved left ventricular systolic function. Stage I   diastolic dysfunction. EF 65%   - repeat ECHO estimated EF 60-65%  - albumin level has been low might contribute to edema  - edema worsening after IVF with chemo  - Strict I&O's  - Repeat ECHO shows normal systolic function with LVEF 65%

## 2025-06-10 NOTE — ED ADULT TRIAGE NOTE - CHIEF COMPLAINT QUOTE
generalized weakness, loss of appetite x 3-4 days, as per daughter patient was in bed all weekend due to weakness, missed dialysis yesterday, unable to get to dialysis today, patient is complaining of left toe last month but has been having pain wants to get checked out

## 2025-06-10 NOTE — PHYSICAL THERAPY INITIAL EVALUATION ADULT - ADDITIONAL COMMENTS
As per dtr(Glenna) at bed side, pt resides in an apartment c dtr c 9 steps to enter c L rail up, once inside there are no other steps, she has tub shower, no grab bars but planning to put one, standard toilet. Independent c all ADL's and household ambulation without device and community ambulation using rollator.

## 2025-06-11 ENCOUNTER — TRANSCRIPTION ENCOUNTER (OUTPATIENT)
Age: 85
End: 2025-06-11

## 2025-06-11 VITALS
TEMPERATURE: 98 F | HEART RATE: 74 BPM | OXYGEN SATURATION: 94 % | SYSTOLIC BLOOD PRESSURE: 101 MMHG | RESPIRATION RATE: 17 BRPM | DIASTOLIC BLOOD PRESSURE: 42 MMHG

## 2025-06-11 LAB
ALBUMIN SERPL ELPH-MCNC: 2.4 G/DL — LOW (ref 3.3–5)
ALP SERPL-CCNC: 66 U/L — SIGNIFICANT CHANGE UP (ref 40–120)
ALT FLD-CCNC: 9 U/L — LOW (ref 12–78)
ANION GAP SERPL CALC-SCNC: 14 MMOL/L — SIGNIFICANT CHANGE UP (ref 5–17)
AST SERPL-CCNC: 10 U/L — LOW (ref 15–37)
BILIRUB SERPL-MCNC: 0.3 MG/DL — SIGNIFICANT CHANGE UP (ref 0.2–1.2)
BUN SERPL-MCNC: 64 MG/DL — HIGH (ref 7–23)
CALCIUM SERPL-MCNC: 10.3 MG/DL — HIGH (ref 8.5–10.1)
CHLORIDE SERPL-SCNC: 95 MMOL/L — LOW (ref 96–108)
CO2 SERPL-SCNC: 24 MMOL/L — SIGNIFICANT CHANGE UP (ref 22–31)
CREAT SERPL-MCNC: 11.8 MG/DL — HIGH (ref 0.5–1.3)
EGFR: 3 ML/MIN/1.73M2 — LOW
EGFR: 3 ML/MIN/1.73M2 — LOW
GLUCOSE SERPL-MCNC: 110 MG/DL — HIGH (ref 70–99)
HCT VFR BLD CALC: 28.4 % — LOW (ref 34.5–45)
HGB BLD-MCNC: 9 G/DL — LOW (ref 11.5–15.5)
MAGNESIUM SERPL-MCNC: 2.5 MG/DL — SIGNIFICANT CHANGE UP (ref 1.6–2.6)
MCHC RBC-ENTMCNC: 31 PG — SIGNIFICANT CHANGE UP (ref 27–34)
MCHC RBC-ENTMCNC: 31.7 G/DL — LOW (ref 32–36)
MCV RBC AUTO: 97.9 FL — SIGNIFICANT CHANGE UP (ref 80–100)
NRBC BLD AUTO-RTO: 0 /100 WBCS — SIGNIFICANT CHANGE UP (ref 0–0)
PHOSPHATE SERPL-MCNC: 6.9 MG/DL — HIGH (ref 2.5–4.5)
PLATELET # BLD AUTO: 237 K/UL — SIGNIFICANT CHANGE UP (ref 150–400)
POTASSIUM SERPL-MCNC: 3.3 MMOL/L — LOW (ref 3.5–5.3)
POTASSIUM SERPL-SCNC: 3.3 MMOL/L — LOW (ref 3.5–5.3)
PROT SERPL-MCNC: 6.5 GM/DL — SIGNIFICANT CHANGE UP (ref 6–8.3)
RBC # BLD: 2.9 M/UL — LOW (ref 3.8–5.2)
RBC # FLD: 15.4 % — HIGH (ref 10.3–14.5)
SODIUM SERPL-SCNC: 133 MMOL/L — LOW (ref 135–145)
WBC # BLD: 5.35 K/UL — SIGNIFICANT CHANGE UP (ref 3.8–10.5)
WBC # FLD AUTO: 5.35 K/UL — SIGNIFICANT CHANGE UP (ref 3.8–10.5)

## 2025-06-11 PROCEDURE — 99239 HOSP IP/OBS DSCHRG MGMT >30: CPT

## 2025-06-11 RX ORDER — MIDODRINE HYDROCHLORIDE 5 MG/1
1 TABLET ORAL
Qty: 0 | Refills: 0 | DISCHARGE
Start: 2025-06-11

## 2025-06-11 RX ORDER — ASPIRIN 325 MG
1 TABLET ORAL
Qty: 0 | Refills: 0 | DISCHARGE
Start: 2025-06-11

## 2025-06-11 RX ORDER — ATORVASTATIN CALCIUM 80 MG/1
1 TABLET, FILM COATED ORAL
Qty: 0 | Refills: 0 | DISCHARGE
Start: 2025-06-11

## 2025-06-11 RX ORDER — FERROUS SULFATE 137(45) MG
1 TABLET, EXTENDED RELEASE ORAL
Qty: 0 | Refills: 0 | DISCHARGE
Start: 2025-06-11

## 2025-06-11 RX ADMIN — HEPARIN SODIUM 5000 UNIT(S): 1000 INJECTION INTRAVENOUS; SUBCUTANEOUS at 05:34

## 2025-06-11 RX ADMIN — Medication 1334 MILLIGRAM(S): at 12:26

## 2025-06-11 RX ADMIN — Medication 1334 MILLIGRAM(S): at 17:49

## 2025-06-11 RX ADMIN — Medication 81 MILLIGRAM(S): at 15:29

## 2025-06-11 RX ADMIN — Medication 325 MILLIGRAM(S): at 15:29

## 2025-06-11 RX ADMIN — MIDODRINE HYDROCHLORIDE 10 MILLIGRAM(S): 5 TABLET ORAL at 15:29

## 2025-06-11 NOTE — DISCHARGE NOTE NURSING/CASE MANAGEMENT/SOCIAL WORK - FINANCIAL ASSISTANCE
[facility] provides services at a reduced cost to those who are determined to be eligible through [facility]’s financial assistance program. Information regarding [facility]’s financial assistance program can be found by going to [email or web address] or by calling [phone].

## 2025-06-11 NOTE — PROGRESS NOTE ADULT - SUBJECTIVE AND OBJECTIVE BOX
[facility] NEPHROLOGY SERVICES, [facility]  NEPHROLOGY AND HYPERTENSION  [address]  [address]  [place] [address]  [phone]    MD CRISTINE GONZALEZ MD YELENA ROSENBERG, MD BINNY KOSHY, MD CHRISTOPHER CAPUTO, MD TOBIAS KANG MD          Patient events noted  No distress  feels well  PT eval completed    MEDICATIONS  (STANDING):  aspirin enteric coated 81 milliGRAM(s) Oral daily  atorvastatin 40 milliGRAM(s) Oral at bedtime  calcium acetate 1334 milliGRAM(s) Oral three times a day with meals  ferrous    sulfate 325 milliGRAM(s) Oral daily  heparin   Injectable 5000 Unit(s) SubCutaneous every 12 hours    MEDICATIONS  (PRN):  acetaminophen     Tablet .. 650 milliGRAM(s) Oral every 6 hours PRN Mild Pain (1 - 3), Moderate Pain (4 - 6)  melatonin 3 milliGRAM(s) Oral at bedtime PRN Insomnia  midodrine. 10 milliGRAM(s) Oral three times a day PRN SBP less than 100      06-11-25 @ 07:01  -  06-11-25 @ 17:07  --------------------------------------------------------  IN: 300 mL / OUT: 1000 mL / NET: -700 mL      PHYSICAL EXAM:      T(C): 36.5 (06-11-25 @ 15:22), Max: 37.1 (06-10-25 @ 23:55)  HR: 87 (06-11-25 @ 15:22) (69 - 108)  BP: 93/56 (06-11-25 @ 15:22) (92/53 - 117/59)  RR: 18 (06-11-25 @ 15:22) (17 - 18)  SpO2: 97% (06-11-25 @ 15:22) (94% - 100%)  Wt(kg): --  Lungs clear  Heart S1S2  Abd soft NT ND  Extremities:   tr edema                                    9.0    5.35  )-----------( 237      ( 11 Jun 2025 06:24 )             28.4     06-11    133[L]  |  95[L]  |  64[H]  ----------------------------<  110[H]  3.3[L]   |  24  |  11.80[H]    Ca    10.3[H]      11 Jun 2025 06:24  Phos  6.9     06-11  Mg     2.5     06-11    TPro  6.5  /  Alb  2.4[L]  /  TBili  0.3  /  DBili  x   /  AST  10[L]  /  ALT  9[L]  /  AlkPhos  66  06-11      LIVER FUNCTIONS - ( 11 Jun 2025 06:24 )  Alb: 2.4 g/dL / Pro: 6.5 gm/dL / ALK PHOS: 66 U/L / ALT: 9 U/L / AST: 10 U/L / GGT: x           Creatinine Trend: 11.80<--, 11.40<--        Assessment   ESRD, weakness, recent toe infection, on po abx  Completed HD today; BP acceptable  Plan  PT eval noted home PT  Discharge home  Midodrine prn         Reinaldo Vidal MD
Patient is a 85y old  Female who presents with a chief complaint of generalized weakness/impaired ambulation, missed dialysis (10 Jones 2025 21:46)      INTERVAL HPI/OVERNIGHT EVENTS:  NAEO VSS  seen and examined at bedside     MEDICATIONS  (STANDING):  aspirin enteric coated 81 milliGRAM(s) Oral daily  atorvastatin 40 milliGRAM(s) Oral at bedtime  calcium acetate 1334 milliGRAM(s) Oral three times a day with meals  ferrous    sulfate 325 milliGRAM(s) Oral daily  heparin   Injectable 5000 Unit(s) SubCutaneous every 12 hours    MEDICATIONS  (PRN):  acetaminophen     Tablet .. 650 milliGRAM(s) Oral every 6 hours PRN Mild Pain (1 - 3), Moderate Pain (4 - 6)  melatonin 3 milliGRAM(s) Oral at bedtime PRN Insomnia  midodrine. 10 milliGRAM(s) Oral three times a day PRN SBP less than 100      Allergies    No Known Allergies    Intolerances        REVIEW OF SYSTEMS:  CONSTITUTIONAL: No fever, weight loss, or fatigue  EYES: No eye pain, visual disturbances, or discharge  ENMT:  No difficulty hearing, tinnitus, vertigo; No sinus or throat pain  NECK: No pain or stiffness  BREASTS: No pain, masses, or nipple discharge  RESPIRATORY: No cough, wheezing, chills or hemoptysis; No shortness of breath  CARDIOVASCULAR: No chest pain, palpitations, dizziness, or leg swelling  GASTROINTESTINAL: No abdominal or epigastric pain. No nausea, vomiting, or hematemesis; No diarrhea or constipation. No melena or hematochezia.  GENITOURINARY: No dysuria, frequency, hematuria, or incontinence  NEUROLOGICAL: No headaches, memory loss, loss of strength, numbness, or tremors  SKIN: No itching, burning, rashes, or lesions   LYMPH NODES: No enlarged glands  ENDOCRINE: No heat or cold intolerance; No hair loss  MUSCULOSKELETAL: No joint pain or swelling; No muscle, back, or extremity pain  PSYCHIATRIC: No depression, anxiety, mood swings, or difficulty sleeping  HEME/LYMPH: No easy bruising, or bleeding gums  ALLERGY AND IMMUNOLOGIC: No hives or eczema    Vital Signs Last 24 Hrs  T(C): 37 (11 Jun 2025 05:01), Max: 37.1 (10 Jones 2025 23:55)  T(F): 98.6 (11 Jun 2025 05:01), Max: 98.8 (10 Jones 2025 23:55)  HR: 76 (11 Jun 2025 05:01) (69 - 108)  BP: 110/64 (11 Jun 2025 05:01) (92/53 - 116/62)  BP(mean): --  RR: 17 (11 Jun 2025 05:01) (17 - 19)  SpO2: 97% (11 Jun 2025 05:01) (94% - 100%)    Parameters below as of 11 Jun 2025 05:01  Patient On (Oxygen Delivery Method): room air        PHYSICAL EXAM:  GENERAL: NAD, well-groomed, well-developed  HEAD:  Atraumatic, Normocephalic  EYES: EOMI, PERRLA, conjunctiva and sclera clear  ENMT: No tonsillar erythema, exudates, or enlargement; Moist mucous membranes, Good dentition, No lesions  NECK: Supple, No JVD, Normal thyroid  NERVOUS SYSTEM:  Alert & Oriented X3, Good concentration; Motor Strength 5/5 B/L upper and lower extremities; DTRs 2+ intact and symmetric  CHEST/LUNG: Clear to percussion bilaterally; No rales, rhonchi, wheezing, or rubs  HEART: Regular rate and rhythm; No murmurs, rubs, or gallops  ABDOMEN: Soft, Nontender, Nondistended; Bowel sounds present  EXTREMITIES:  2+ Peripheral Pulses, No clubbing, cyanosis, or edema  LYMPH: No lymphadenopathy noted  SKIN: No rashes or lesions    LABS:                        9.0    5.35  )-----------( 237      ( 11 Jun 2025 06:24 )             28.4     06-11    133[L]  |  95[L]  |  64[H]  ----------------------------<  110[H]  3.3[L]   |  24  |  11.80[H]    Ca    10.3[H]      11 Jun 2025 06:24  Phos  6.9     06-11  Mg     2.5     06-11    TPro  6.5  /  Alb  2.4[L]  /  TBili  0.3  /  DBili  x   /  AST  10[L]  /  ALT  9[L]  /  AlkPhos  66  06-11    PT/INR - ( 10 Jones 2025 13:10 )   PT: 13.8 sec;   INR: 1.22 ratio         PTT - ( 10 Jones 2025 13:10 )  PTT:28.5 sec  Urinalysis Basic - ( 11 Jun 2025 06:24 )    Color: x / Appearance: x / SG: x / pH: x  Gluc: 110 mg/dL / Ketone: x  / Bili: x / Urobili: x   Blood: x / Protein: x / Nitrite: x   Leuk Esterase: x / RBC: x / WBC x   Sq Epi: x / Non Sq Epi: x / Bacteria: x      CAPILLARY BLOOD GLUCOSE          RADIOLOGY & ADDITIONAL TESTS:    Imaging Personally Reviewed:  [ X] YES  [ ] NO    Consultant(s) Notes Reviewed:  [ X] YES  [ ] NO    Care Discussed with Consultants/Other Providers [X ] YES  [ ] NO

## 2025-06-11 NOTE — DISCHARGE NOTE PROVIDER - NSDCMRMEDTOKEN_GEN_ALL_CORE_FT
Alphagan: 1 drop(s) to each affected eye 2 times a day - 0.1%  aspirin 81 mg oral delayed release tablet: 1 tab(s) orally once a day  atorvastatin 40 mg oral tablet: 1 tab(s) orally once a day (at bedtime)  calcium acetate 667 mg oral tablet: 2 tab(s) orally 3 times a day  dorzolamide 2% ophthalmic solution: 1 drop(s) to each affected eye once a day (at bedtime)  ferrous sulfate 325 mg (65 mg elemental iron) oral tablet: 1 tab(s) orally once a day  midodrine 10 mg oral tablet: 1 tab(s) orally 3 times a day As needed SBP less than 100  pantoprazole 40 mg oral delayed release tablet: 1 tab(s) orally once a day (before a meal)

## 2025-06-11 NOTE — PROGRESS NOTE ADULT - ASSESSMENT
85 years old female with h/o HLD, ESRD on MWF dialysis, GERD present to ED with complain of generalized weakness and missed dialysis. Patient reported decrease oral intake for last 3-4 days associated with generalized weakness. Due to generalized weakness, patient is not able to ambulate. Patient missed Monday dialysis. No fever, chills, cough, SOB, nausea, vomiting or diarrhea. Patient has left big toe pain, following with podiatry.  Hemodynamically stable, afebrile, sat well at RA. No leukocytosis, Na 129, K 3.4. Flu/COVID/RSV negative    #hyponatremia   Suspect 2/2 hypervolemia   Improving with HD

## 2025-06-11 NOTE — DISCHARGE NOTE PROVIDER - NSDCCPCAREPLAN_GEN_ALL_CORE_FT
PRINCIPAL DISCHARGE DIAGNOSIS  Diagnosis: ESRD needing dialysis  Assessment and Plan of Treatment: You came to the hospital due to weakness and missing dialysis. You received dialysis 6/11. Dr Vidal recommended resuming your regular Monday, Wednesday, Friday schedule. Please go to outpatient dialysis as scheduled on Friday.   Physical therapy saw you during the admission and recommended home PT.

## 2025-06-11 NOTE — DISCHARGE NOTE PROVIDER - NSDCFUADDAPPT_GEN_ALL_CORE_FT
APPTS ARE READY TO BE MADE: [X] YES    Best Family or Patient Contact (if needed):    Additional Information about above appointments (if needed):    1: PCP 1 week   2:   3:     Other comments or requests:

## 2025-06-11 NOTE — DISCHARGE NOTE PROVIDER - HOSPITAL COURSE
HPI:  85 years old female with h/o HLD, ESRD on MWF dialysis, GERD present to ED with complain of generalized weakness and missed dialysis. Patient reported decrease oral intake for last 3-4 days associated with generalized weakness. Due to generalized weakness, patient is not able to ambulate. Patient missed Monday dialysis. No fever, chills, cough, SOB, nausea, vomiting or diarrhea. Patient has left big toe pain, following with podiatry.  Hemodynamically stable, afebrile, sat well at RA. No leukocytosis, Na 129, K 3.4. Flu/COVID/RSV negative    Hospital course:  Patient admitted after presenting with weakness and missing Hd session outpatient. Patient received 1 session HD 6/11 and advised to resume outpatient MWF schedule. Pt evaluated the patient and recommended home PT with rolling walker, patient has a walker at home. Plan discussed with daughter at bedside. Afebrile and hemodynamically stable at time of discharge. Discharged home with home PT.     #hyponatremia   Suspect 2/2 hypervolemia   Improving with HD       Problem/Plan - 1:  ·  Problem: Impaired ambulation.   ·  Plan: #L first toe OA  decrease oral intake, generalized weakness and impaired ambulation.  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   ·  Plan: missed Monday dialysis  Na 129, K 3.4  Nephrology consulted  Renal diet.     Problem/Plan - 3:  ·  Problem: Hyperlipidemia, unspecified.

## 2025-06-11 NOTE — PROGRESS NOTE ADULT - REASON FOR ADMISSION
generalized weakness/impaired ambulation, missed dialysis
generalized weakness/impaired ambulation, missed dialysis

## 2025-06-11 NOTE — DISCHARGE NOTE PROVIDER - ATTENDING DISCHARGE PHYSICAL EXAMINATION:
Vital Signs Last 24 Hrs  T(C): 36.5 (11 Jun 2025 15:22), Max: 37.1 (10 Jones 2025 23:55)  T(F): 97.7 (11 Jun 2025 15:22), Max: 98.8 (10 Jones 2025 23:55)  HR: 87 (11 Jun 2025 15:22) (69 - 108)  BP: 93/56 (11 Jun 2025 15:22) (92/53 - 117/59)  BP(mean): --  RR: 18 (11 Jun 2025 15:22) (17 - 18)  SpO2: 97% (11 Jun 2025 15:22) (94% - 100%)    Parameters below as of 11 Jun 2025 15:22  Patient On (Oxygen Delivery Method): room air    GENERAL: NAD, well-groomed, well-developed  HEAD:  Atraumatic, Normocephalic  EYES: EOMI, sclera non-icteric  ENMT:  Moist mucous membranes  NERVOUS SYSTEM:  Alert & Oriented X3, Good concentration  CHEST/LUNG: Clear to percussion bilaterally; No rales, rhonchi, wheezing, or rubs  HEART: Regular rate and rhythm; No murmurs, rubs, or gallops  ABDOMEN: Soft, Nontender, Nondistended; Bowel sounds present  EXTREMITIES:  no Le edema   SKIN: warm dry

## 2025-06-11 NOTE — DISCHARGE NOTE NURSING/CASE MANAGEMENT/SOCIAL WORK - PATIENT PORTAL LINK FT
You can access the FollowMyHealth Patient Portal offered by [facility] by registering at the following website: [email or web address]. By joining [facility] FollowMyHealth portal, you will also be able to view your health information using other applications (apps) compatible with our system.

## 2025-06-17 DIAGNOSIS — E11.22 TYPE 2 DIABETES MELLITUS WITH DIABETIC CHRONIC KIDNEY DISEASE: ICD-10-CM

## 2025-06-17 DIAGNOSIS — E87.1 HYPO-OSMOLALITY AND HYPONATREMIA: ICD-10-CM

## 2025-06-17 DIAGNOSIS — H40.9 UNSPECIFIED GLAUCOMA: ICD-10-CM

## 2025-06-17 DIAGNOSIS — Z91.158 PATIENT'S NONCOMPLIANCE WITH RENAL DIALYSIS FOR OTHER REASON: ICD-10-CM

## 2025-06-17 DIAGNOSIS — R53.1 WEAKNESS: ICD-10-CM

## 2025-06-17 DIAGNOSIS — N18.6 END STAGE RENAL DISEASE: ICD-10-CM

## 2025-06-17 DIAGNOSIS — Z79.82 LONG TERM (CURRENT) USE OF ASPIRIN: ICD-10-CM

## 2025-06-17 DIAGNOSIS — M19.072 PRIMARY OSTEOARTHRITIS, LEFT ANKLE AND FOOT: ICD-10-CM

## 2025-06-17 DIAGNOSIS — K21.9 GASTRO-ESOPHAGEAL REFLUX DISEASE WITHOUT ESOPHAGITIS: ICD-10-CM

## 2025-06-17 DIAGNOSIS — E66.9 OBESITY, UNSPECIFIED: ICD-10-CM

## 2025-06-17 DIAGNOSIS — I12.0 HYPERTENSIVE CHRONIC KIDNEY DISEASE WITH STAGE 5 CHRONIC KIDNEY DISEASE OR END STAGE RENAL DISEASE: ICD-10-CM

## 2025-06-17 DIAGNOSIS — Z79.899 OTHER LONG TERM (CURRENT) DRUG THERAPY: ICD-10-CM

## 2025-06-17 DIAGNOSIS — Z99.2 DEPENDENCE ON RENAL DIALYSIS: ICD-10-CM

## 2025-06-17 DIAGNOSIS — D63.1 ANEMIA IN CHRONIC KIDNEY DISEASE: ICD-10-CM

## 2025-06-17 DIAGNOSIS — E78.5 HYPERLIPIDEMIA, UNSPECIFIED: ICD-10-CM
